# Patient Record
Sex: FEMALE | Race: WHITE | Employment: PART TIME | URBAN - NONMETROPOLITAN AREA
[De-identification: names, ages, dates, MRNs, and addresses within clinical notes are randomized per-mention and may not be internally consistent; named-entity substitution may affect disease eponyms.]

---

## 2017-02-27 ENCOUNTER — TELEPHONE (OUTPATIENT)
Dept: FAMILY MEDICINE CLINIC | Age: 22
End: 2017-02-27

## 2017-03-01 ENCOUNTER — OFFICE VISIT (OUTPATIENT)
Dept: FAMILY MEDICINE CLINIC | Age: 22
End: 2017-03-01

## 2017-03-01 VITALS
TEMPERATURE: 98.6 F | DIASTOLIC BLOOD PRESSURE: 73 MMHG | SYSTOLIC BLOOD PRESSURE: 122 MMHG | OXYGEN SATURATION: 98 % | HEIGHT: 67 IN | WEIGHT: 214 LBS | HEART RATE: 84 BPM | BODY MASS INDEX: 33.59 KG/M2 | RESPIRATION RATE: 12 BRPM

## 2017-03-01 DIAGNOSIS — R09.89 RUNNY NOSE: ICD-10-CM

## 2017-03-01 DIAGNOSIS — J02.9 SORE THROAT: ICD-10-CM

## 2017-03-01 DIAGNOSIS — J06.9 VIRAL UPPER RESPIRATORY TRACT INFECTION: Primary | ICD-10-CM

## 2017-03-01 PROCEDURE — 99213 OFFICE O/P EST LOW 20 MIN: CPT | Performed by: NURSE PRACTITIONER

## 2017-03-01 ASSESSMENT — ENCOUNTER SYMPTOMS
COUGH: 1
WHEEZING: 0
EYE PAIN: 0
DIARRHEA: 0
EYE REDNESS: 0
NAUSEA: 0
RHINORRHEA: 1
EYE DISCHARGE: 0
CONSTIPATION: 0
VOMITING: 0
SHORTNESS OF BREATH: 0
SORE THROAT: 1

## 2017-03-06 ENCOUNTER — TELEPHONE (OUTPATIENT)
Dept: FAMILY MEDICINE CLINIC | Age: 22
End: 2017-03-06

## 2017-03-08 ENCOUNTER — OFFICE VISIT (OUTPATIENT)
Dept: FAMILY MEDICINE CLINIC | Age: 22
End: 2017-03-08

## 2017-03-08 VITALS
SYSTOLIC BLOOD PRESSURE: 112 MMHG | DIASTOLIC BLOOD PRESSURE: 72 MMHG | RESPIRATION RATE: 24 BRPM | BODY MASS INDEX: 33.13 KG/M2 | WEIGHT: 218.6 LBS | HEART RATE: 76 BPM | HEIGHT: 68 IN

## 2017-03-08 DIAGNOSIS — F41.9 ANXIETY: ICD-10-CM

## 2017-03-08 DIAGNOSIS — F32.A DEPRESSION, UNSPECIFIED DEPRESSION TYPE: Primary | ICD-10-CM

## 2017-03-08 PROCEDURE — 99214 OFFICE O/P EST MOD 30 MIN: CPT | Performed by: FAMILY MEDICINE

## 2017-03-08 RX ORDER — DIVALPROEX SODIUM 250 MG/1
250 TABLET, DELAYED RELEASE ORAL 2 TIMES DAILY
Qty: 60 TABLET | Refills: 1 | Status: SHIPPED | OUTPATIENT
Start: 2017-03-08 | End: 2017-04-20 | Stop reason: SDUPTHER

## 2017-03-08 RX ORDER — CITALOPRAM 20 MG/1
20 TABLET ORAL DAILY
Qty: 30 TABLET | Refills: 1 | Status: SHIPPED | OUTPATIENT
Start: 2017-03-08 | End: 2017-04-13 | Stop reason: SDUPTHER

## 2017-03-08 ASSESSMENT — ENCOUNTER SYMPTOMS
CONSTIPATION: 0
SHORTNESS OF BREATH: 0
EYE PAIN: 0
COUGH: 0
RHINORRHEA: 0
VOMITING: 0
DIARRHEA: 0
CHEST TIGHTNESS: 0
ABDOMINAL PAIN: 0
NAUSEA: 0
SORE THROAT: 0
BACK PAIN: 0
WHEEZING: 0
BLOOD IN STOOL: 0

## 2017-04-13 ENCOUNTER — OFFICE VISIT (OUTPATIENT)
Dept: PSYCHIATRY | Age: 22
End: 2017-04-13

## 2017-04-13 DIAGNOSIS — F32.A DEPRESSION, UNSPECIFIED DEPRESSION TYPE: ICD-10-CM

## 2017-04-13 DIAGNOSIS — F43.10 PTSD (POST-TRAUMATIC STRESS DISORDER): ICD-10-CM

## 2017-04-13 DIAGNOSIS — F31.32 BIPOLAR 1 DISORDER, DEPRESSED, MODERATE (HCC): Primary | ICD-10-CM

## 2017-04-13 DIAGNOSIS — F41.0 PANIC DISORDER WITHOUT AGORAPHOBIA WITH MILD PANIC ATTACKS: ICD-10-CM

## 2017-04-13 DIAGNOSIS — F41.9 ANXIETY: ICD-10-CM

## 2017-04-13 PROCEDURE — 99204 OFFICE O/P NEW MOD 45 MIN: CPT | Performed by: NURSE PRACTITIONER

## 2017-04-13 RX ORDER — CITALOPRAM 20 MG/1
40 TABLET ORAL DAILY
Qty: 30 TABLET | Refills: 0 | Status: SHIPPED | OUTPATIENT
Start: 2017-04-13 | End: 2017-06-08 | Stop reason: DRUGHIGH

## 2017-04-20 ENCOUNTER — OFFICE VISIT (OUTPATIENT)
Dept: PSYCHIATRY | Age: 22
End: 2017-04-20

## 2017-04-20 DIAGNOSIS — F31.32 BIPOLAR 1 DISORDER, DEPRESSED, MODERATE (HCC): Primary | ICD-10-CM

## 2017-04-20 DIAGNOSIS — F41.9 ANXIETY: ICD-10-CM

## 2017-04-20 DIAGNOSIS — F43.10 PTSD (POST-TRAUMATIC STRESS DISORDER): ICD-10-CM

## 2017-04-20 DIAGNOSIS — F41.0 PANIC DISORDER WITHOUT AGORAPHOBIA WITH MILD PANIC ATTACKS: ICD-10-CM

## 2017-04-20 DIAGNOSIS — F32.A DEPRESSION, UNSPECIFIED DEPRESSION TYPE: ICD-10-CM

## 2017-04-20 PROCEDURE — 99213 OFFICE O/P EST LOW 20 MIN: CPT | Performed by: NURSE PRACTITIONER

## 2017-04-20 RX ORDER — BUSPIRONE HYDROCHLORIDE 5 MG/1
5 TABLET ORAL 3 TIMES DAILY
Qty: 90 TABLET | Refills: 2 | Status: SHIPPED | OUTPATIENT
Start: 2017-04-20 | End: 2017-04-20 | Stop reason: SDUPTHER

## 2017-04-20 RX ORDER — DIVALPROEX SODIUM 250 MG/1
500 TABLET, EXTENDED RELEASE ORAL NIGHTLY
Qty: 60 TABLET | Refills: 2 | Status: SHIPPED | OUTPATIENT
Start: 2017-04-20 | End: 2017-04-20 | Stop reason: SDUPTHER

## 2017-04-20 RX ORDER — DIVALPROEX SODIUM 250 MG/1
250 TABLET, DELAYED RELEASE ORAL
Qty: 30 TABLET | Refills: 2 | Status: SHIPPED | OUTPATIENT
Start: 2017-04-20 | End: 2017-04-20 | Stop reason: SDUPTHER

## 2017-04-20 RX ORDER — DIVALPROEX SODIUM 250 MG/1
500 TABLET, EXTENDED RELEASE ORAL NIGHTLY
Qty: 60 TABLET | Refills: 2 | Status: SHIPPED | OUTPATIENT
Start: 2017-04-20 | End: 2017-06-08 | Stop reason: DRUGHIGH

## 2017-04-20 RX ORDER — BUSPIRONE HYDROCHLORIDE 5 MG/1
5 TABLET ORAL 3 TIMES DAILY
Qty: 90 TABLET | Refills: 2 | Status: SHIPPED | OUTPATIENT
Start: 2017-04-20 | End: 2017-06-08 | Stop reason: ALTCHOICE

## 2017-04-20 RX ORDER — TRAZODONE HYDROCHLORIDE 50 MG/1
50 TABLET ORAL NIGHTLY
Qty: 30 TABLET | Refills: 2 | Status: SHIPPED | OUTPATIENT
Start: 2017-04-20 | End: 2017-04-20 | Stop reason: SDUPTHER

## 2017-04-20 RX ORDER — DIVALPROEX SODIUM 250 MG/1
250 TABLET, DELAYED RELEASE ORAL
Qty: 30 TABLET | Refills: 2 | Status: SHIPPED | OUTPATIENT
Start: 2017-04-20 | End: 2017-06-08 | Stop reason: SDUPTHER

## 2017-04-20 RX ORDER — TRAZODONE HYDROCHLORIDE 50 MG/1
50 TABLET ORAL NIGHTLY
Qty: 30 TABLET | Refills: 2 | Status: SHIPPED | OUTPATIENT
Start: 2017-04-20 | End: 2017-06-08 | Stop reason: DRUGHIGH

## 2017-06-08 ENCOUNTER — OFFICE VISIT (OUTPATIENT)
Dept: PSYCHIATRY | Age: 22
End: 2017-06-08

## 2017-06-08 DIAGNOSIS — F41.0 PANIC DISORDER WITHOUT AGORAPHOBIA WITH MILD PANIC ATTACKS: ICD-10-CM

## 2017-06-08 DIAGNOSIS — F31.32 BIPOLAR 1 DISORDER, DEPRESSED, MODERATE (HCC): Primary | ICD-10-CM

## 2017-06-08 PROCEDURE — 99214 OFFICE O/P EST MOD 30 MIN: CPT | Performed by: NURSE PRACTITIONER

## 2017-06-08 RX ORDER — DIVALPROEX SODIUM 250 MG/1
250 TABLET, DELAYED RELEASE ORAL 2 TIMES DAILY
Qty: 30 TABLET | Refills: 0 | Status: ON HOLD | OUTPATIENT
Start: 2017-06-08 | End: 2017-10-19

## 2017-06-08 RX ORDER — BUPROPION HYDROCHLORIDE 75 MG/1
75 TABLET ORAL DAILY
Qty: 30 TABLET | Refills: 0 | Status: ON HOLD | OUTPATIENT
Start: 2017-06-08 | End: 2017-10-19

## 2017-06-08 RX ORDER — TRAZODONE HYDROCHLORIDE 50 MG/1
50 TABLET ORAL NIGHTLY
Qty: 30 TABLET | Refills: 0 | Status: ON HOLD | OUTPATIENT
Start: 2017-06-08 | End: 2017-10-19

## 2017-06-08 RX ORDER — HYDROXYZINE PAMOATE 25 MG/1
25 CAPSULE ORAL 3 TIMES DAILY PRN
Qty: 90 CAPSULE | Refills: 0 | Status: SHIPPED | OUTPATIENT
Start: 2017-06-08 | End: 2017-06-22

## 2017-10-19 ENCOUNTER — HOSPITAL ENCOUNTER (EMERGENCY)
Dept: CT IMAGING | Age: 22
Discharge: HOME | End: 2017-10-19
Payer: COMMERCIAL

## 2017-10-19 ENCOUNTER — HOSPITAL ENCOUNTER (OUTPATIENT)
Age: 22
Discharge: HOME | End: 2017-10-22
Attending: NURSE PRACTITIONER | Admitting: SURGERY
Payer: COMMERCIAL

## 2017-10-19 ENCOUNTER — ANESTHESIA EVENT (OUTPATIENT)
Dept: OPERATING ROOM | Age: 22
End: 2017-10-19
Payer: COMMERCIAL

## 2017-10-19 ENCOUNTER — ANESTHESIA (OUTPATIENT)
Dept: OPERATING ROOM | Age: 22
End: 2017-10-19
Payer: COMMERCIAL

## 2017-10-19 VITALS
RESPIRATION RATE: 51 BRPM | OXYGEN SATURATION: 100 % | SYSTOLIC BLOOD PRESSURE: 140 MMHG | DIASTOLIC BLOOD PRESSURE: 65 MMHG | TEMPERATURE: 96.8 F

## 2017-10-19 DIAGNOSIS — K35.80 ACUTE APPENDICITIS, UNSPECIFIED ACUTE APPENDICITIS TYPE: Primary | ICD-10-CM

## 2017-10-19 LAB
ALBUMIN SERPL-MCNC: 4.2 G/DL (ref 3.5–5.1)
ALP BLD-CCNC: 86 U/L (ref 38–126)
ALT SERPL-CCNC: 14 U/L (ref 11–66)
AST SERPL-CCNC: 14 U/L (ref 5–40)
BASOPHILS # BLD: 0.3 %
BASOPHILS ABSOLUTE: 0 THOU/MM3 (ref 0–0.1)
BILIRUB SERPL-MCNC: 0.9 MG/DL (ref 0.3–1.2)
BILIRUBIN DIRECT: < 0.2 MG/DL (ref 0–0.3)
EOSINOPHIL # BLD: 0.5 %
EOSINOPHILS ABSOLUTE: 0.1 THOU/MM3 (ref 0–0.4)
HCT VFR BLD CALC: 42.6 % (ref 37–47)
HEMOGLOBIN: 13.7 GM/DL (ref 12–16)
LYMPHOCYTES # BLD: 13.6 %
LYMPHOCYTES ABSOLUTE: 2 THOU/MM3 (ref 1–4.8)
MCH RBC QN AUTO: 28.9 PG (ref 27–31)
MCHC RBC AUTO-ENTMCNC: 32.2 GM/DL (ref 33–37)
MCV RBC AUTO: 90 FL (ref 81–99)
MONOCYTES # BLD: 4.4 %
MONOCYTES ABSOLUTE: 0.7 THOU/MM3 (ref 0.4–1.3)
NUCLEATED RED BLOOD CELLS: 0 /100 WBC
PDW BLD-RTO: 11.5 % (ref 11.5–14.5)
PLATELET # BLD: 185 THOU/MM3 (ref 130–400)
PMV BLD AUTO: 9.9 MCM (ref 7.4–10.4)
PREGNANCY, SERUM: NEGATIVE
RBC # BLD: 4.74 MILL/MM3 (ref 4.2–5.4)
RBC # BLD: NORMAL 10*6/UL
SEG NEUTROPHILS: 81.2 %
SEGMENTED NEUTROPHILS ABSOLUTE COUNT: 12.2 THOU/MM3 (ref 1.8–7.7)
TOTAL PROTEIN: 6.9 G/DL (ref 6.1–8)
WBC # BLD: 15 THOU/MM3 (ref 4.8–10.8)

## 2017-10-19 PROCEDURE — 96374 THER/PROPH/DIAG INJ IV PUSH: CPT

## 2017-10-19 PROCEDURE — 2500000003 HC RX 250 WO HCPCS: Performed by: SURGERY

## 2017-10-19 PROCEDURE — G0378 HOSPITAL OBSERVATION PER HR: HCPCS

## 2017-10-19 PROCEDURE — 6360000002 HC RX W HCPCS

## 2017-10-19 PROCEDURE — 3700000001 HC ADD 15 MINUTES (ANESTHESIA): Performed by: SURGERY

## 2017-10-19 PROCEDURE — 7100000001 HC PACU RECOVERY - ADDTL 15 MIN: Performed by: SURGERY

## 2017-10-19 PROCEDURE — 84703 CHORIONIC GONADOTROPIN ASSAY: CPT

## 2017-10-19 PROCEDURE — 88304 TISSUE EXAM BY PATHOLOGIST: CPT

## 2017-10-19 PROCEDURE — S0028 INJECTION, FAMOTIDINE, 20 MG: HCPCS | Performed by: SURGERY

## 2017-10-19 PROCEDURE — 2500000003 HC RX 250 WO HCPCS

## 2017-10-19 PROCEDURE — 6360000002 HC RX W HCPCS: Performed by: SURGERY

## 2017-10-19 PROCEDURE — 44970 LAPAROSCOPY APPENDECTOMY: CPT | Performed by: SURGERY

## 2017-10-19 PROCEDURE — 3700000000 HC ANESTHESIA ATTENDED CARE: Performed by: SURGERY

## 2017-10-19 PROCEDURE — 6360000004 HC RX CONTRAST MEDICATION: Performed by: NURSE PRACTITIONER

## 2017-10-19 PROCEDURE — 6360000002 HC RX W HCPCS: Performed by: EMERGENCY MEDICINE

## 2017-10-19 PROCEDURE — 6360000002 HC RX W HCPCS: Performed by: ANESTHESIOLOGY

## 2017-10-19 PROCEDURE — 36415 COLL VENOUS BLD VENIPUNCTURE: CPT

## 2017-10-19 PROCEDURE — 85025 COMPLETE CBC W/AUTO DIFF WBC: CPT

## 2017-10-19 PROCEDURE — 99284 EMERGENCY DEPT VISIT MOD MDM: CPT

## 2017-10-19 PROCEDURE — 2720000010 HC SURG SUPPLY STERILE: Performed by: SURGERY

## 2017-10-19 PROCEDURE — 99215 OFFICE O/P EST HI 40 MIN: CPT

## 2017-10-19 PROCEDURE — 6360000002 HC RX W HCPCS: Performed by: NURSE PRACTITIONER

## 2017-10-19 PROCEDURE — 2580000003 HC RX 258: Performed by: EMERGENCY MEDICINE

## 2017-10-19 PROCEDURE — 96375 TX/PRO/DX INJ NEW DRUG ADDON: CPT

## 2017-10-19 PROCEDURE — 2580000003 HC RX 258: Performed by: SURGERY

## 2017-10-19 PROCEDURE — 3600000013 HC SURGERY LEVEL 3 ADDTL 15MIN: Performed by: SURGERY

## 2017-10-19 PROCEDURE — 74177 CT ABD & PELVIS W/CONTRAST: CPT

## 2017-10-19 PROCEDURE — 2500000003 HC RX 250 WO HCPCS: Performed by: ANESTHESIOLOGY

## 2017-10-19 PROCEDURE — 80076 HEPATIC FUNCTION PANEL: CPT

## 2017-10-19 PROCEDURE — 7100000000 HC PACU RECOVERY - FIRST 15 MIN: Performed by: SURGERY

## 2017-10-19 PROCEDURE — 2720000003 HC MISC SUTURE/STAPLES/RELOADS/ETC: Performed by: SURGERY

## 2017-10-19 PROCEDURE — 96361 HYDRATE IV INFUSION ADD-ON: CPT

## 2017-10-19 PROCEDURE — 3600000003 HC SURGERY LEVEL 3 BASE: Performed by: SURGERY

## 2017-10-19 PROCEDURE — 99214 OFFICE O/P EST MOD 30 MIN: CPT | Performed by: NURSE PRACTITIONER

## 2017-10-19 PROCEDURE — 99219 PR INITIAL OBSERVATION CARE/DAY 50 MINUTES: CPT | Performed by: SURGERY

## 2017-10-19 RX ORDER — ACETAMINOPHEN 325 MG/1
650 TABLET ORAL EVERY 4 HOURS PRN
Status: DISCONTINUED | OUTPATIENT
Start: 2017-10-19 | End: 2017-10-22 | Stop reason: HOSPADM

## 2017-10-19 RX ORDER — SODIUM CHLORIDE 0.9 % (FLUSH) 0.9 %
10 SYRINGE (ML) INJECTION EVERY 12 HOURS SCHEDULED
Status: DISCONTINUED | OUTPATIENT
Start: 2017-10-19 | End: 2017-10-19 | Stop reason: SDUPTHER

## 2017-10-19 RX ORDER — AMPICILLIN AND SULBACTAM 1; .5 G/1; G/1
INJECTION, POWDER, FOR SOLUTION INTRAMUSCULAR; INTRAVENOUS PRN
Status: DISCONTINUED | OUTPATIENT
Start: 2017-10-19 | End: 2017-10-19 | Stop reason: SDUPTHER

## 2017-10-19 RX ORDER — HYDROCODONE BITARTRATE AND ACETAMINOPHEN 5; 325 MG/1; MG/1
2 TABLET ORAL EVERY 4 HOURS PRN
Status: DISCONTINUED | OUTPATIENT
Start: 2017-10-19 | End: 2017-10-20

## 2017-10-19 RX ORDER — DEXAMETHASONE SODIUM PHOSPHATE 4 MG/ML
INJECTION, SOLUTION INTRA-ARTICULAR; INTRALESIONAL; INTRAMUSCULAR; INTRAVENOUS; SOFT TISSUE PRN
Status: DISCONTINUED | OUTPATIENT
Start: 2017-10-19 | End: 2017-10-19 | Stop reason: SDUPTHER

## 2017-10-19 RX ORDER — MORPHINE SULFATE 4 MG/ML
4 INJECTION, SOLUTION INTRAMUSCULAR; INTRAVENOUS ONCE
Status: DISCONTINUED | OUTPATIENT
Start: 2017-10-19 | End: 2017-10-19

## 2017-10-19 RX ORDER — FENTANYL CITRATE 50 UG/ML
INJECTION, SOLUTION INTRAMUSCULAR; INTRAVENOUS PRN
Status: DISCONTINUED | OUTPATIENT
Start: 2017-10-19 | End: 2017-10-19 | Stop reason: SDUPTHER

## 2017-10-19 RX ORDER — ACETAMINOPHEN 325 MG/1
650 TABLET ORAL EVERY 4 HOURS PRN
Status: DISCONTINUED | OUTPATIENT
Start: 2017-10-19 | End: 2017-10-19 | Stop reason: SDUPTHER

## 2017-10-19 RX ORDER — ONDANSETRON 2 MG/ML
4 INJECTION INTRAMUSCULAR; INTRAVENOUS
Status: COMPLETED | OUTPATIENT
Start: 2017-10-19 | End: 2017-10-19

## 2017-10-19 RX ORDER — MORPHINE SULFATE 2 MG/ML
2 INJECTION, SOLUTION INTRAMUSCULAR; INTRAVENOUS
Status: DISCONTINUED | OUTPATIENT
Start: 2017-10-19 | End: 2017-10-20

## 2017-10-19 RX ORDER — 0.9 % SODIUM CHLORIDE 0.9 %
1000 INTRAVENOUS SOLUTION INTRAVENOUS ONCE
Status: COMPLETED | OUTPATIENT
Start: 2017-10-19 | End: 2017-10-19

## 2017-10-19 RX ORDER — HYDRALAZINE HYDROCHLORIDE 20 MG/ML
5 INJECTION INTRAMUSCULAR; INTRAVENOUS EVERY 10 MIN PRN
Status: DISCONTINUED | OUTPATIENT
Start: 2017-10-19 | End: 2017-10-19 | Stop reason: HOSPADM

## 2017-10-19 RX ORDER — TRAZODONE HYDROCHLORIDE 50 MG/1
50 TABLET ORAL NIGHTLY
Status: DISCONTINUED | OUTPATIENT
Start: 2017-10-19 | End: 2017-10-20

## 2017-10-19 RX ORDER — ONDANSETRON 2 MG/ML
4 INJECTION INTRAMUSCULAR; INTRAVENOUS ONCE
Status: DISCONTINUED | OUTPATIENT
Start: 2017-10-19 | End: 2017-10-19 | Stop reason: SDUPTHER

## 2017-10-19 RX ORDER — PROPOFOL 10 MG/ML
INJECTION, EMULSION INTRAVENOUS PRN
Status: DISCONTINUED | OUTPATIENT
Start: 2017-10-19 | End: 2017-10-19 | Stop reason: SDUPTHER

## 2017-10-19 RX ORDER — ONDANSETRON 2 MG/ML
4 INJECTION INTRAMUSCULAR; INTRAVENOUS EVERY 6 HOURS PRN
Status: DISCONTINUED | OUTPATIENT
Start: 2017-10-19 | End: 2017-10-20

## 2017-10-19 RX ORDER — GLYCOPYRROLATE 0.2 MG/ML
INJECTION INTRAMUSCULAR; INTRAVENOUS PRN
Status: DISCONTINUED | OUTPATIENT
Start: 2017-10-19 | End: 2017-10-19 | Stop reason: SDUPTHER

## 2017-10-19 RX ORDER — MIDAZOLAM HYDROCHLORIDE 1 MG/ML
INJECTION INTRAMUSCULAR; INTRAVENOUS PRN
Status: DISCONTINUED | OUTPATIENT
Start: 2017-10-19 | End: 2017-10-19 | Stop reason: SDUPTHER

## 2017-10-19 RX ORDER — MEPERIDINE HYDROCHLORIDE 25 MG/ML
12.5 INJECTION INTRAMUSCULAR; INTRAVENOUS; SUBCUTANEOUS EVERY 5 MIN PRN
Status: DISCONTINUED | OUTPATIENT
Start: 2017-10-19 | End: 2017-10-19 | Stop reason: HOSPADM

## 2017-10-19 RX ORDER — BUPROPION HYDROCHLORIDE 75 MG/1
75 TABLET ORAL DAILY
Status: DISCONTINUED | OUTPATIENT
Start: 2017-10-20 | End: 2017-10-20

## 2017-10-19 RX ORDER — SODIUM CHLORIDE 0.9 % (FLUSH) 0.9 %
10 SYRINGE (ML) INJECTION EVERY 12 HOURS SCHEDULED
Status: DISCONTINUED | OUTPATIENT
Start: 2017-10-19 | End: 2017-10-20

## 2017-10-19 RX ORDER — SODIUM CHLORIDE 0.9 % (FLUSH) 0.9 %
10 SYRINGE (ML) INJECTION PRN
Status: DISCONTINUED | OUTPATIENT
Start: 2017-10-19 | End: 2017-10-19 | Stop reason: SDUPTHER

## 2017-10-19 RX ORDER — KETOROLAC TROMETHAMINE 30 MG/ML
30 INJECTION, SOLUTION INTRAMUSCULAR; INTRAVENOUS ONCE
Status: COMPLETED | OUTPATIENT
Start: 2017-10-19 | End: 2017-10-19

## 2017-10-19 RX ORDER — HYDROCODONE BITARTRATE AND ACETAMINOPHEN 5; 325 MG/1; MG/1
1 TABLET ORAL EVERY 4 HOURS PRN
Status: DISCONTINUED | OUTPATIENT
Start: 2017-10-19 | End: 2017-10-20

## 2017-10-19 RX ORDER — FENTANYL CITRATE 50 UG/ML
50 INJECTION, SOLUTION INTRAMUSCULAR; INTRAVENOUS EVERY 5 MIN PRN
Status: COMPLETED | OUTPATIENT
Start: 2017-10-19 | End: 2017-10-19

## 2017-10-19 RX ORDER — MORPHINE SULFATE 2 MG/ML
4 INJECTION, SOLUTION INTRAMUSCULAR; INTRAVENOUS
Status: DISCONTINUED | OUTPATIENT
Start: 2017-10-19 | End: 2017-10-20

## 2017-10-19 RX ORDER — BUPIVACAINE HYDROCHLORIDE AND EPINEPHRINE 5; 5 MG/ML; UG/ML
INJECTION, SOLUTION EPIDURAL; INTRACAUDAL; PERINEURAL PRN
Status: DISCONTINUED | OUTPATIENT
Start: 2017-10-19 | End: 2017-10-19 | Stop reason: HOSPADM

## 2017-10-19 RX ORDER — NEOSTIGMINE METHYLSULFATE 1 MG/ML
INJECTION, SOLUTION INTRAVENOUS PRN
Status: DISCONTINUED | OUTPATIENT
Start: 2017-10-19 | End: 2017-10-19 | Stop reason: SDUPTHER

## 2017-10-19 RX ORDER — SODIUM CHLORIDE 0.9 % (FLUSH) 0.9 %
10 SYRINGE (ML) INJECTION PRN
Status: DISCONTINUED | OUTPATIENT
Start: 2017-10-19 | End: 2017-10-20

## 2017-10-19 RX ORDER — ROCURONIUM BROMIDE 10 MG/ML
INJECTION, SOLUTION INTRAVENOUS PRN
Status: DISCONTINUED | OUTPATIENT
Start: 2017-10-19 | End: 2017-10-19 | Stop reason: SDUPTHER

## 2017-10-19 RX ORDER — MORPHINE SULFATE 2 MG/ML
2 INJECTION, SOLUTION INTRAMUSCULAR; INTRAVENOUS EVERY 5 MIN PRN
Status: DISCONTINUED | OUTPATIENT
Start: 2017-10-19 | End: 2017-10-19 | Stop reason: HOSPADM

## 2017-10-19 RX ORDER — SODIUM CHLORIDE 9 MG/ML
INJECTION, SOLUTION INTRAVENOUS CONTINUOUS
Status: DISCONTINUED | OUTPATIENT
Start: 2017-10-19 | End: 2017-10-20

## 2017-10-19 RX ORDER — DIVALPROEX SODIUM 250 MG/1
250 TABLET, DELAYED RELEASE ORAL 2 TIMES DAILY
Status: DISCONTINUED | OUTPATIENT
Start: 2017-10-19 | End: 2017-10-20

## 2017-10-19 RX ORDER — LABETALOL HYDROCHLORIDE 5 MG/ML
5 INJECTION, SOLUTION INTRAVENOUS EVERY 5 MIN PRN
Status: DISCONTINUED | OUTPATIENT
Start: 2017-10-19 | End: 2017-10-19 | Stop reason: HOSPADM

## 2017-10-19 RX ORDER — DIPHENHYDRAMINE HYDROCHLORIDE 50 MG/ML
12.5 INJECTION INTRAMUSCULAR; INTRAVENOUS
Status: DISCONTINUED | OUTPATIENT
Start: 2017-10-19 | End: 2017-10-19 | Stop reason: HOSPADM

## 2017-10-19 RX ADMIN — Medication 10 ML: at 22:44

## 2017-10-19 RX ADMIN — MEPERIDINE HYDROCHLORIDE 12.5 MG: 25 INJECTION, SOLUTION INTRAMUSCULAR; INTRAVENOUS; SUBCUTANEOUS at 20:26

## 2017-10-19 RX ADMIN — MORPHINE SULFATE 2 MG: 2 INJECTION, SOLUTION INTRAMUSCULAR; INTRAVENOUS at 20:35

## 2017-10-19 RX ADMIN — NEOSTIGMINE METHYLSULFATE 3 MG: 1 INJECTION, SOLUTION INTRAVENOUS at 19:25

## 2017-10-19 RX ADMIN — FENTANYL CITRATE 50 MCG: 50 INJECTION, SOLUTION INTRAMUSCULAR; INTRAVENOUS at 20:00

## 2017-10-19 RX ADMIN — FENTANYL CITRATE 50 MCG: 50 INJECTION, SOLUTION INTRAMUSCULAR; INTRAVENOUS at 19:50

## 2017-10-19 RX ADMIN — FENTANYL CITRATE 50 MCG: 50 INJECTION INTRAMUSCULAR; INTRAVENOUS at 19:25

## 2017-10-19 RX ADMIN — ONDANSETRON 4 MG: 2 INJECTION INTRAMUSCULAR; INTRAVENOUS at 19:08

## 2017-10-19 RX ADMIN — IOPAMIDOL 85 ML: 755 INJECTION, SOLUTION INTRAVENOUS at 14:44

## 2017-10-19 RX ADMIN — KETOROLAC TROMETHAMINE 30 MG: 30 INJECTION, SOLUTION INTRAMUSCULAR at 17:03

## 2017-10-19 RX ADMIN — FENTANYL CITRATE 50 MCG: 50 INJECTION, SOLUTION INTRAMUSCULAR; INTRAVENOUS at 20:20

## 2017-10-19 RX ADMIN — TAZOBACTAM SODIUM AND PIPERACILLIN SODIUM 3.38 G: 375; 3 INJECTION, SOLUTION INTRAVENOUS at 18:12

## 2017-10-19 RX ADMIN — PROPOFOL 150 MG: 10 INJECTION, EMULSION INTRAVENOUS at 18:52

## 2017-10-19 RX ADMIN — SODIUM CHLORIDE 1000 ML: 9 INJECTION, SOLUTION INTRAVENOUS at 16:00

## 2017-10-19 RX ADMIN — SODIUM CHLORIDE: 9 INJECTION, SOLUTION INTRAVENOUS at 21:30

## 2017-10-19 RX ADMIN — MIDAZOLAM HYDROCHLORIDE 2 MG: 1 INJECTION, SOLUTION INTRAMUSCULAR; INTRAVENOUS at 18:47

## 2017-10-19 RX ADMIN — AMPICILLIN SODIUM AND SULBACTAM SODIUM 1.5 G: 1; .5 INJECTION, POWDER, FOR SOLUTION INTRAMUSCULAR; INTRAVENOUS at 18:55

## 2017-10-19 RX ADMIN — FENTANYL CITRATE 50 MCG: 50 INJECTION INTRAMUSCULAR; INTRAVENOUS at 19:15

## 2017-10-19 RX ADMIN — DEXAMETHASONE SODIUM PHOSPHATE 8 MG: 4 INJECTION, SOLUTION INTRAMUSCULAR; INTRAVENOUS at 19:08

## 2017-10-19 RX ADMIN — ROCURONIUM BROMIDE 30 MG: 10 INJECTION INTRAVENOUS at 18:52

## 2017-10-19 RX ADMIN — GLYCOPYRROLATE 0.6 MG: 0.2 INJECTION, SOLUTION INTRAMUSCULAR; INTRAVENOUS at 19:25

## 2017-10-19 RX ADMIN — FENTANYL CITRATE 50 MCG: 50 INJECTION INTRAMUSCULAR; INTRAVENOUS at 19:10

## 2017-10-19 RX ADMIN — FENTANYL CITRATE 50 MCG: 50 INJECTION INTRAMUSCULAR; INTRAVENOUS at 19:35

## 2017-10-19 RX ADMIN — FENTANYL CITRATE 100 MCG: 50 INJECTION INTRAMUSCULAR; INTRAVENOUS at 18:47

## 2017-10-19 RX ADMIN — FAMOTIDINE 20 MG: 10 INJECTION, SOLUTION INTRAVENOUS at 22:44

## 2017-10-19 RX ADMIN — FENTANYL CITRATE 50 MCG: 50 INJECTION, SOLUTION INTRAMUSCULAR; INTRAVENOUS at 20:10

## 2017-10-19 RX ADMIN — MORPHINE SULFATE 2 MG: 2 INJECTION, SOLUTION INTRAMUSCULAR; INTRAVENOUS at 22:45

## 2017-10-19 RX ADMIN — IOHEXOL 50 ML: 240 INJECTION, SOLUTION INTRATHECAL; INTRAVASCULAR; INTRAVENOUS; ORAL at 14:44

## 2017-10-19 ASSESSMENT — PAIN DESCRIPTION - ONSET
ONSET: AWAKENED FROM SLEEP
ONSET: ON-GOING

## 2017-10-19 ASSESSMENT — PULMONARY FUNCTION TESTS
PIF_VALUE: 23
PIF_VALUE: 21
PIF_VALUE: 2
PIF_VALUE: 22
PIF_VALUE: 23
PIF_VALUE: 11
PIF_VALUE: 20
PIF_VALUE: 21
PIF_VALUE: 17
PIF_VALUE: 0
PIF_VALUE: 23
PIF_VALUE: 10
PIF_VALUE: 2
PIF_VALUE: 10
PIF_VALUE: 23
PIF_VALUE: 21
PIF_VALUE: 23
PIF_VALUE: 21
PIF_VALUE: 10
PIF_VALUE: 9
PIF_VALUE: 23
PIF_VALUE: 0
PIF_VALUE: 24
PIF_VALUE: 9
PIF_VALUE: 13
PIF_VALUE: 22
PIF_VALUE: 23
PIF_VALUE: 19
PIF_VALUE: 10
PIF_VALUE: 21
PIF_VALUE: 23
PIF_VALUE: 18
PIF_VALUE: 24
PIF_VALUE: 10
PIF_VALUE: 23
PIF_VALUE: 22
PIF_VALUE: 21
PIF_VALUE: 21
PIF_VALUE: 23
PIF_VALUE: 11
PIF_VALUE: 21
PIF_VALUE: 9
PIF_VALUE: 24

## 2017-10-19 ASSESSMENT — PAIN DESCRIPTION - DESCRIPTORS
DESCRIPTORS: PRESSURE;ACHING
DESCRIPTORS: SHARP
DESCRIPTORS: SHARP
DESCRIPTORS: STABBING

## 2017-10-19 ASSESSMENT — PAIN DESCRIPTION - LOCATION
LOCATION: ABDOMEN

## 2017-10-19 ASSESSMENT — PAIN SCALES - GENERAL
PAINLEVEL_OUTOF10: 7
PAINLEVEL_OUTOF10: 9
PAINLEVEL_OUTOF10: 8
PAINLEVEL_OUTOF10: 6
PAINLEVEL_OUTOF10: 8
PAINLEVEL_OUTOF10: 9
PAINLEVEL_OUTOF10: 7
PAINLEVEL_OUTOF10: 8
PAINLEVEL_OUTOF10: 6

## 2017-10-19 ASSESSMENT — ENCOUNTER SYMPTOMS
NAUSEA: 0
DIARRHEA: 0
BLOOD IN STOOL: 0
CONSTIPATION: 0
VOMITING: 1
ABDOMINAL PAIN: 1

## 2017-10-19 ASSESSMENT — PAIN DESCRIPTION - PAIN TYPE
TYPE: ACUTE PAIN
TYPE: ACUTE PAIN
TYPE: ACUTE PAIN;SURGICAL PAIN

## 2017-10-19 ASSESSMENT — PAIN DESCRIPTION - PROGRESSION
CLINICAL_PROGRESSION: NOT CHANGED

## 2017-10-19 ASSESSMENT — PAIN DESCRIPTION - FREQUENCY
FREQUENCY: CONTINUOUS
FREQUENCY: CONTINUOUS

## 2017-10-19 ASSESSMENT — PAIN DESCRIPTION - ORIENTATION
ORIENTATION: RIGHT;LOWER;UPPER
ORIENTATION: MID;LOWER;LEFT
ORIENTATION: RIGHT;LOWER;MID
ORIENTATION: RIGHT

## 2017-10-19 NOTE — BRIEF OP NOTE
Brief Postoperative Note    Sebastián Fernandez  YOB: 1995  [id number]    Pre-operative Diagnosis: Acute appendicitis    Post-operative Diagnosis: Same    Procedure: Laparoscopic appendectomy    Anesthesia: General and Local    Surgeons/Assistants:  Shane/King    Estimated Blood Loss: 20 ml    Complications: None    Specimens: Was Obtained: appendix    Findings: as above    Electronically signed by Kayla Montalvo MD on 10/19/2017 at 6:55 PM

## 2017-10-19 NOTE — ED PROVIDER NOTES
[facility] EMERGENCY DEPT      CHIEF COMPLAINT       Chief Complaint   Patient presents with    Abdominal Pain       Nurses Notes reviewed and I agree except as noted in the HPI. HISTORY OF PRESENT ILLNESS    Benji Perez is a 23 y.o. female who presents Complaint right lower quadrant pain, she's had the pain since yesterday, she is vomited 6 times. Patient noted to Have acute appendicitis on CAT scan done outpatient. Onset: acute  Duration: one day  Timing: constant  Location of Pain: RLQ pain  Intesity/severity: moderate  Modifying Factors: movement  Relieved by;  Previous Episodes; Tx Before arrival: none  REVIEW OF SYSTEMS      Review of Systems   Constitutional: Negative for fever, chills, diaphoresis and fatigue. HENT: Negative for congestion, drooling, facial swelling and sore throat. Eyes: Negative for photophobia, pain and discharge. Respiratory: Negative for cough, shortness of breath, wheezing and stridor. Cardiovascular: Negative for chest pain, palpitations and leg swelling. Gastrointestinal: Positive for abdominal pain; Neg for blood in stool and abdominal distention. Endocrine: Negative for cold intolerance, heat intolerance, polydipsia and polyuria. Genitourinary: Negative for dysuria, urgency, hematuria and difficulty urinating. Musculoskeletal: Negative for gait problem, neck pain and neck stiffness. Allergic/Immunologic: Negative for environmental allergies, food allergies and immunocompromised state. Skin; No rash, No itching  Neurological: Negative for seizures, weakness and numbness. Hematological: Negative for adenopathy. Does not bruise/bleed easily. Psychiatric/Behavioral: Negative for hallucinations, confusion and agitation. PAST MEDICAL HISTORY    has a past medical history of Asthma and Depression. SURGICAL HISTORY      has a past surgical history that includes knee surgery (Right).     CURRENT MEDICATIONS       Previous Medications    BUPROPION (WELLBUTRIN) 75 MG TABLET    Take 1 tablet by mouth daily    DIVALPROEX (DEPAKOTE) 250 MG DR TABLET    Take 1 tablet by mouth 2 times daily    TRAZODONE (DESYREL) 50 MG TABLET    Take 1 tablet by mouth nightly       ALLERGIES     has No Known Allergies. FAMILY HISTORY     indicated that her mother is alive. She indicated that her father is alive. She indicated that the status of her maternal grandmother is unknown. She indicated that the status of her maternal grandfather is unknown.    family history includes Arthritis in her maternal grandfather, maternal grandmother, and mother; Asthma in her maternal grandmother; Bipolar Disorder in her father; Cancer in her maternal grandmother; Depression in her father, maternal grandmother, and mother; Early Death in her maternal grandmother; High Blood Pressure in her maternal grandmother; Schizophrenia in her father. SOCIAL HISTORY      reports that she has been smoking Cigarettes. She has been smoking about 0.50 packs per day. She has never used smokeless tobacco. She reports that she does not drink alcohol or use drugs. PHYSICAL EXAM     INITIAL VITALS:  height is 5' 6\" (1.676 m) and weight is 220 lb (99.8 kg). Her oral temperature is 98.6 °F (37 °C). Her blood pressure is 115/68 and her pulse is 88. Her respiration is 18 and oxygen saturation is 100%. Physical Exam   Constitutional:  well-developed and well-nourished. HENT: Head: Normocephalic, atraumatic, Bilateral external ears normal, Oropharynx mosit, No oral exudates, Nose normal.   Eyes: PERRL, EOMI, Conjunctiva normal, No discharge. No scleral icterus  Neck: Normal range of motion, No tenderness, Supple  Lympatics: No lymphadenopathy. Cardiovascular: Normal rate, regular rhythm, S1 normal and S2 normal.  Exam reveals no gallop. Pulmonary/Chest: Effort normal and breath sounds normal. No accessory muscle usage or stridor. No respiratory distress. no wheezes. has no rales.  exhibits no tenderness. Abdominal: Soft. Bowel sounds are normal.  exhibits no distension. There is no tenderness. There is no rebound and no guarding. Genitourinary:   Extremities: No edema, no tenderness, no cyanosis, no clubbing. Musculoskeletal: Good range of motion in major joints is observed. No major deformities noted. Neurological: Alert and oriented ×3, normal motor function, normal sensory function, no focal deficits. GCS   Skin: Skin is warm, dry and intact. No rash noted. No erythema. Psychiatric: Affect normal, judgment normal, mood normal.  DIFFERENTIAL DIAGNOSIS:   Appy    DIAGNOSTIC RESULTS     EKG: All EKG's are interpreted by the Emergency Department Physician who either signs or Co-signs this chart in the absence of a cardiologist.      RADIOLOGY: non-plain film images(s) such as CT, Ultrasound and MRI are read by the radiologist.  Plain radiographic images are visualized and preliminarily interpreted by the emergency physician unless otherwise stated below. LABS:   Labs Reviewed   CBC WITH AUTO DIFFERENTIAL - Abnormal; Notable for the following:        Result Value    WBC 15.0 (*)     MCHC 32.2 (*)     All other components within normal limits   HCG, SERUM, QUALITATIVE   DIFFERENTIAL   HEPATIC FUNCTION PANEL   URINALYSIS WITH MICROSCOPIC   PREGNANCY, URINE       EMERGENCY DEPARTMENT COURSE:   Vitals:    Vitals:    10/19/17 1210 10/19/17 1556   BP: 124/67 115/68   Pulse: 95 88   Resp: 18 18   Temp: 98.5 °F (36.9 °C) 98.6 °F (37 °C)   TempSrc: Temporal Oral   SpO2: 99% 100%   Weight: 220 lb (99.8 kg)    Height: 5' 6\" (1.676 m)          CRITICAL CARE:       CONSULTS:  None    PROCEDURES:  None    FINAL IMPRESSION      1. Acute appendicitis, unspecified acute appendicitis type          DISPOSITION/PLAN   Decision To Admit   Pt presented for right lower quadrant pain, has appendicitis on CAT scan. Local peritonitis, case discussed with Dr. Mireille Baca, will come see patient.   Patient does not want pain medication. PATIENT REFERRED TO:  No follow-up provider specified.     DISCHARGE MEDICATIONS:  New Prescriptions    No medications on file       (Please note that portions of this note were completed with a voice recognition program.  Efforts were made to edit the dictations but occasionally words are mis-transcribed.)    Susie Martins, [facility]   10/20/17 5756

## 2017-10-19 NOTE — ED NOTES
Patient c/o right lower quadrant pain 8/10 pain is less as long as she lays still. Patient denies needs. Patient is tearful. /68 HR 87 O2 sat 99 Resp 18. Transported via [facility] to [address] [place] [address] ED. Report given to [facility].       Cristhian Hedrick RN  10/19/17 0576

## 2017-10-19 NOTE — ANESTHESIA PRE PROCEDURE
Social History   Substance Use Topics    Smoking status: Current Every Day Smoker     Packs/day: 0.50     Types: Cigarettes    Smokeless tobacco: Never Used    Alcohol use No                                Ready to quit: Not Answered  Counseling given: Not Answered      Vital Signs (Current):   Vitals:    10/19/17 1210 10/19/17 1556 10/19/17 1652 10/19/17 1812   BP: 124/67 115/68 (!) 100/51 115/68   Pulse: 95 88 90 88   Resp: 18 18 17 16   Temp: 98.5 °F (36.9 °C) 98.6 °F (37 °C)     TempSrc: Temporal Oral     SpO2: 99% 100% 100% 100%   Weight: 220 lb (99.8 kg)      Height: 5' 6\" (1.676 m)                                                 BP Readings from Last 3 Encounters:   10/19/17 115/68   10/19/17 123/62   03/08/17 112/72       NPO Status:                                                                                 BMI:   Wt Readings from Last 3 Encounters:   10/19/17 220 lb (99.8 kg)   03/08/17 218 lb 9.6 oz (99.2 kg)   03/01/17 214 lb (97.1 kg)     Body mass index is 35.51 kg/m². CBC:   Lab Results   Component Value Date    WBC 15.0 10/19/2017    RBC 4.74 10/19/2017    RBC 5.12 01/20/2016    HGB 13.7 10/19/2017    HCT 42.6 10/19/2017    MCV 90 10/19/2017    RDW 11.5 10/19/2017     10/19/2017       CMP:   Lab Results   Component Value Date     01/20/2016    K 3.9 01/20/2016     01/20/2016    CO2 24 01/20/2016    BUN 10 01/20/2016    CREATININE 0.7 01/20/2016    LABGLOM >90 10/09/2013    GLUCOSE 93 01/20/2016    PROT 6.9 10/19/2017    CALCIUM 9.3 01/20/2016    BILITOT 0.9 10/19/2017    ALKPHOS 86 10/19/2017    AST 14 10/19/2017    ALT 14 10/19/2017       POC Tests: No results for input(s): POCGLU, POCNA, POCK, POCCL, POCBUN, POCHEMO, POCHCT in the last 72 hours.     Coags: No results found for: PROTIME, INR, APTT    HCG (If Applicable):   Lab Results   Component Value Date    PREGSERUM NEGATIVE 10/19/2017        ABGs: No results found for: PHART, PO2ART, UCO7JRG, MOT9LFY, BEART, Z1BZVTPG     Type & Screen (If Applicable):  Lab Results   Component Value Date    [address] POS 10/09/2013       Anesthesia Evaluation  Patient summary reviewed and Nursing notes reviewed no history of anesthetic complications:   Airway: Mallampati: II  TM distance: >3 FB   Neck ROM: full  Mouth opening: > = 3 FB Dental:          Pulmonary: breath sounds clear to auscultation  (+) asthma:                            Cardiovascular:            Rhythm: regular  Rate: normal                    Neuro/Psych:   (+) psychiatric history:            GI/Hepatic/Renal:             Endo/Other:                     Abdominal:   (+) obese,         Vascular:                                      Anesthesia Plan      general     ASA 2       Induction: intravenous. MIPS: Postoperative opioids intended and Prophylactic antiemetics administered. Anesthetic plan and risks discussed with patient.       Plan discussed with attending and surgical team.                  Shaun Owens DO   10/19/2017

## 2017-10-19 NOTE — ED NOTES
Patient with continued complaint of R sided abdominal pain. Rates pain 7/10 on pain scale, although does not want \"anything too strong\". Updated Dr. Armond Canavan with verbalized order for alternative pain medication. Upon return from restroom, side rails placed up x2. Call light replaced back within reach. Updated on plan of care with verbalized understanding.       Jacques Oro RN  10/19/17 6679 [facility]  10/19/17 6977

## 2017-10-19 NOTE — ANESTHESIA PRE PROCEDURE
[id number]     Type & Screen (If Applicable):  Lab Results   Component Value Date    LABRH POS 10/09/2013       Anesthesia Evaluation    Airway: Mallampati: II       Mouth opening: > = 3 FB Dental:          Pulmonary:   (+) asthma:                            Cardiovascular:        (-) hypertension      Rhythm: regular                      Neuro/Psych:   (+) psychiatric history:            GI/Hepatic/Renal:             Endo/Other:                     Abdominal:   (+) obese,         Vascular:                                      Anesthesia Plan      general     ASA 2       Induction: intravenous. MIPS: Postoperative opioids intended. Anesthetic plan and risks discussed with patient.                       Pola Goodrich MD   10/19/2017

## 2017-10-19 NOTE — ED TRIAGE NOTES
Patient states she has right side lower abdominal pain that radiates to midline above the umbilicus. Patient states it feels like someone is stabbing her. Has vomited x 5 since this morning and has been yellow and now dry heaving.

## 2017-10-19 NOTE — H&P
[facility]  General Surgery History and Physical  Dr. Estrellita Bianchi    Pt Name: Rajeev Morgan  MRN: [id number]  YOB: 1995  Date of evaluation: 10/19/2017  Primary Care Physician: Clyde Bolanos MD  Patient evaluated at the request of  Dr. Lianet Dick  Reason for evaluation: early acute appendicitis    IMPRESSIONS:   1. Acute appendicitis  2. Right lower quadrant abdominal pain  3. Vomiting  4. History of bipolar and depression   does not have any pertinent problems on file. PLANS:   1. Admit type: Observation  2. It is expected this patient's LOS will be: Less than 2 midnights  3. Anticipated Disposition Upon Discharge: Home  4. Analgesics and antiemetics on a prn basis  5. IV hydration  6. Empiric antibiotic coverage  7. DVT prophylaxis with SCD's  8. NPO informed consent  9. Pre-op for surgery  10. Plan for laparoscopic appendectomy this evening. Discussed surgical intervention with patient. Possible risks and complications discussed. Discussed plan of care and length of  stay. Patient tearful regarding surgery, but does agree to proceed. SUBJECTIVE:   Chief Complaint: Right lower quadrant pain    History of Present Illness:  Carlitos Segal is a 23 y.o. female patient who comes from [facility] ambulatory care center for early acute appendicitis. Patient presented to [place] earlier this afternoon for increasing right upper quadrant pain that has now turned into right lower quadrant pain. Describes it as sharp, constant, ache. Radiates to right flank area. Nothing makes it worse or better. She states abdominal pain started in the middle of the night. Patient woke up to vomiting multiple times, but denies any nausea with it. Patient denies any abdominal pain previous to this. She was in normal health before going to bed. No change in appetite. No fevers, chills, or sweats. Normal bowel pattern. No melena or hematochezia. No sob or chest pain. No lightheadedness or dizziness.  No issue with urinating. She is resting comfortably. No generalized abdominal pain. No current nausea or vomiting. No abdominal distention. She is tearful during examination due to the possibility of surgery. She has two little children at home. Only surgical history of right knee. Health history includes depression and mood disorder for which she takes Wellbutrin and Depakote. No anticoagulant use or significant health history. Past Medical History      Diagnosis Date    Asthma not on meds    Depression      Past Surgical History      Procedure Laterality Date    KNEE SURGERY Right      Medications  Prior to Admission medications    Medication Sig Start Date End Date Taking? Authorizing Provider   buPROPion [facility]) 75 MG tablet Take 1 tablet by mouth daily 6/8/17   Kim Ramírez CNP   divalproex (DEPAKOTE) 250 MG DR tablet Take 1 tablet by mouth 2 times daily 6/8/17   Kim Ramírez CNP   traZODone (DESYREL) 50 MG tablet Take 1 tablet by mouth nightly 6/8/17   Kim Ramírez CNP    Scheduled Meds:   sodium chloride  1,000 mL Intravenous Once    ondansetron  4 mg Intravenous Once    ketorolac  30 mg Intravenous Once     Continuous Infusions:   PRN Meds:. Allergies  has No Known Allergies. Family History  family history includes Arthritis in her maternal grandfather, maternal grandmother, and mother; Asthma in her maternal grandmother; Bipolar Disorder in her father; Cancer in her maternal grandmother; Depression in her father, maternal grandmother, and mother; Early Death in her maternal grandmother; High Blood Pressure in her maternal grandmother; Schizophrenia in her father. Social History   reports that she has been smoking Cigarettes. She has been smoking about 0.50 packs per day. She has never used smokeless tobacco. She reports that she does not drink alcohol or use drugs. Review of Systems:  General Denies any fever or chills. No significant unexpected weight change.   HEENT Denies any normal caliber.        There is no pelvic free fluid.    There is no adenopathy.       No suspicious osseous lesions are identified.           Impression   1.   Findings are consistent with early acute appendicitis. No evidence of abscess or perforation at this time.                   **This report has been created using voice recognition software. It may contain minor errors which are inherent in voice recognition technology. **       Final report electronically signed by Dr. Richard Ramos on 10/19/2017 3:08 PM     Electronically signed by Sanket Sifuentes CNP on 10/19/2017 at 5:03 PM    Patient seen and examined independently by me. Above discussed and I agree with Caitlyn Alvarenga CNP. See my additional comments below for updated orders and plan. Labs, cultures, and radiographs where available were reviewed. I discussed patient concerns with the patient's nurse and instructions were given. Please see our orders for the updated patient care plan. - Nothing by mouth. IV antibiotics. Consent. Patient with acute appendicitis on diagnostic imaging and on clinical exam.  Risks, benefits and alternatives discussed with patient in detail. Discussed both conservative/nonoperative treatment plan and appendectomy. She agrees to proceed with surgical correction. Plan on surgery today.     Electronically signed by Stefanie Pearce MD on 10/19/2017 at 6:04 PM

## 2017-10-20 LAB
ANION GAP SERPL CALCULATED.3IONS-SCNC: 14 MEQ/L (ref 8–16)
BUN BLDV-MCNC: 7 MG/DL (ref 7–22)
CALCIUM SERPL-MCNC: 8.4 MG/DL (ref 8.5–10.5)
CHLORIDE BLD-SCNC: 104 MEQ/L (ref 98–111)
CO2: 20 MEQ/L (ref 23–33)
CREAT SERPL-MCNC: 0.6 MG/DL (ref 0.4–1.2)
GFR SERPL CREATININE-BSD FRML MDRD: > 90 ML/MIN/1.73M2
GLUCOSE BLD-MCNC: 148 MG/DL (ref 70–108)
HCT VFR BLD CALC: 39.3 % (ref 37–47)
HEMOGLOBIN: 13.3 GM/DL (ref 12–16)
MCH RBC QN AUTO: 30.1 PG (ref 27–31)
MCHC RBC AUTO-ENTMCNC: 33.8 GM/DL (ref 33–37)
MCV RBC AUTO: 89.1 FL (ref 81–99)
PDW BLD-RTO: 12.8 % (ref 11.5–14.5)
PLATELET # BLD: 173 THOU/MM3 (ref 130–400)
PMV BLD AUTO: 11.2 MCM (ref 7.4–10.4)
POTASSIUM SERPL-SCNC: 4 MEQ/L (ref 3.5–5.2)
RBC # BLD: 4.42 MILL/MM3 (ref 4.2–5.4)
SODIUM BLD-SCNC: 138 MEQ/L (ref 135–145)
WBC # BLD: 11.2 THOU/MM3 (ref 4.8–10.8)

## 2017-10-20 PROCEDURE — 36415 COLL VENOUS BLD VENIPUNCTURE: CPT

## 2017-10-20 PROCEDURE — 80048 BASIC METABOLIC PNL TOTAL CA: CPT

## 2017-10-20 PROCEDURE — 96365 THER/PROPH/DIAG IV INF INIT: CPT

## 2017-10-20 PROCEDURE — 6370000000 HC RX 637 (ALT 250 FOR IP): Performed by: NURSE PRACTITIONER

## 2017-10-20 PROCEDURE — 99024 POSTOP FOLLOW-UP VISIT: CPT | Performed by: SURGERY

## 2017-10-20 PROCEDURE — 96366 THER/PROPH/DIAG IV INF ADDON: CPT

## 2017-10-20 PROCEDURE — 6370000000 HC RX 637 (ALT 250 FOR IP): Performed by: SURGERY

## 2017-10-20 PROCEDURE — 2500000003 HC RX 250 WO HCPCS: Performed by: SURGERY

## 2017-10-20 PROCEDURE — 85027 COMPLETE CBC AUTOMATED: CPT

## 2017-10-20 RX ORDER — IBUPROFEN 800 MG/1
800 TABLET ORAL EVERY 8 HOURS PRN
Status: DISCONTINUED | OUTPATIENT
Start: 2017-10-20 | End: 2017-10-22 | Stop reason: HOSPADM

## 2017-10-20 RX ORDER — KETOROLAC TROMETHAMINE 30 MG/ML
30 INJECTION, SOLUTION INTRAMUSCULAR; INTRAVENOUS EVERY 6 HOURS
Status: DISCONTINUED | OUTPATIENT
Start: 2017-10-20 | End: 2017-10-20

## 2017-10-20 RX ORDER — OXYCODONE HYDROCHLORIDE AND ACETAMINOPHEN 5; 325 MG/1; MG/1
1 TABLET ORAL EVERY 4 HOURS PRN
Status: DISCONTINUED | OUTPATIENT
Start: 2017-10-20 | End: 2017-10-22 | Stop reason: HOSPADM

## 2017-10-20 RX ORDER — OXYCODONE HYDROCHLORIDE AND ACETAMINOPHEN 5; 325 MG/1; MG/1
2 TABLET ORAL EVERY 4 HOURS PRN
Status: DISCONTINUED | OUTPATIENT
Start: 2017-10-20 | End: 2017-10-22 | Stop reason: HOSPADM

## 2017-10-20 RX ORDER — DOCUSATE SODIUM 100 MG/1
100 CAPSULE, LIQUID FILLED ORAL 2 TIMES DAILY PRN
Status: DISCONTINUED | OUTPATIENT
Start: 2017-10-20 | End: 2017-10-22 | Stop reason: HOSPADM

## 2017-10-20 RX ADMIN — IBUPROFEN 800 MG: 800 TABLET, FILM COATED ORAL at 21:22

## 2017-10-20 RX ADMIN — OXYCODONE HYDROCHLORIDE AND ACETAMINOPHEN 2 TABLET: 5; 325 TABLET ORAL at 19:41

## 2017-10-20 RX ADMIN — IBUPROFEN 800 MG: 800 TABLET, FILM COATED ORAL at 12:54

## 2017-10-20 RX ADMIN — OXYCODONE HYDROCHLORIDE AND ACETAMINOPHEN 2 TABLET: 5; 325 TABLET ORAL at 14:28

## 2017-10-20 RX ADMIN — TAZOBACTAM SODIUM AND PIPERACILLIN SODIUM 3.38 G: 375; 3 INJECTION, SOLUTION INTRAVENOUS at 01:02

## 2017-10-20 RX ADMIN — CHLORASEPTIC 1 SPRAY: 1.5 LIQUID ORAL at 12:54

## 2017-10-20 RX ADMIN — DOCUSATE SODIUM 100 MG: 100 CAPSULE ORAL at 21:22

## 2017-10-20 RX ADMIN — HYDROCODONE BITARTRATE AND ACETAMINOPHEN 2 TABLET: 5; 325 TABLET ORAL at 01:02

## 2017-10-20 RX ADMIN — HYDROCODONE BITARTRATE AND ACETAMINOPHEN 2 TABLET: 5; 325 TABLET ORAL at 09:04

## 2017-10-20 RX ADMIN — HYDROCODONE BITARTRATE AND ACETAMINOPHEN 2 TABLET: 5; 325 TABLET ORAL at 05:18

## 2017-10-20 ASSESSMENT — PAIN DESCRIPTION - ORIENTATION
ORIENTATION: LEFT;MID;LOWER
ORIENTATION: LEFT;MID;LOWER
ORIENTATION: LOWER;MID;LEFT
ORIENTATION: LEFT;MID;LOWER
ORIENTATION: LEFT;LOWER;MID
ORIENTATION: LEFT;LOWER;MID

## 2017-10-20 ASSESSMENT — PAIN SCALES - GENERAL
PAINLEVEL_OUTOF10: 7
PAINLEVEL_OUTOF10: 6
PAINLEVEL_OUTOF10: 7
PAINLEVEL_OUTOF10: 5
PAINLEVEL_OUTOF10: 8
PAINLEVEL_OUTOF10: 7
PAINLEVEL_OUTOF10: 5
PAINLEVEL_OUTOF10: 8
PAINLEVEL_OUTOF10: 5
PAINLEVEL_OUTOF10: 7
PAINLEVEL_OUTOF10: 6
PAINLEVEL_OUTOF10: 6
PAINLEVEL_OUTOF10: 4
PAINLEVEL_OUTOF10: 8
PAINLEVEL_OUTOF10: 3

## 2017-10-20 ASSESSMENT — PAIN DESCRIPTION - PAIN TYPE
TYPE: SURGICAL PAIN
TYPE: ACUTE PAIN;SURGICAL PAIN
TYPE: SURGICAL PAIN
TYPE: SURGICAL PAIN

## 2017-10-20 ASSESSMENT — PAIN DESCRIPTION - LOCATION
LOCATION: ABDOMEN

## 2017-10-20 ASSESSMENT — PAIN DESCRIPTION - ONSET
ONSET: ON-GOING

## 2017-10-20 ASSESSMENT — PAIN DESCRIPTION - PROGRESSION
CLINICAL_PROGRESSION: NOT CHANGED
CLINICAL_PROGRESSION: GRADUALLY IMPROVING
CLINICAL_PROGRESSION: NOT CHANGED

## 2017-10-20 ASSESSMENT — PAIN DESCRIPTION - DESCRIPTORS
DESCRIPTORS: ACHING;DISCOMFORT
DESCRIPTORS: ACHING;PRESSURE
DESCRIPTORS: ACHING;DISCOMFORT

## 2017-10-20 ASSESSMENT — PAIN DESCRIPTION - FREQUENCY
FREQUENCY: CONTINUOUS

## 2017-10-20 ASSESSMENT — PAIN DESCRIPTION - DIRECTION: RADIATING_TOWARDS: LEFT FLANK PAIN

## 2017-10-20 NOTE — PROGRESS NOTES
2055  Patient meets PACU D/C criteria at this time. Patient is awake and alert on RA and VSS. Report called to [address].  2115  Patient transported to [address] room 54 on RA.

## 2017-10-20 NOTE — PROGRESS NOTES
Patient arrived to unit from PACU, accompanied by mother and . Patient is awake, alert, and oriented x4. Physical assessment performed, as charted. Patient has three surgical stab sites with steri-strips as dressing; all three are dry and intact. Patient also has ice pack applied to abdomen for comfort. Physician orders and patient plan of care discussed with patient, who verbalized understanding. SCDs are initiated at this time. Patient has pain level of an \"8\" on a 0-10 pain scale; morphine was given at 2035 and will be given again when order verified. Patient has an IV with a 1000 ML bag of 0.9% NS infusing at 100 ML/HR; there is 100 ML infused with 900 ML to be infused. Patient lying in bed, side rails up x2, call light in reach. Patient denies any further questions or concerns at this time.

## 2017-10-20 NOTE — PLAN OF CARE
Problem: SAFETY  Goal: Free from accidental physical injury  Outcome: Met This Shift  Patient is free from falls during this shift. Patient ambulates with non-slip socks on, keeps clear pathway in room with adequate lighting, and bed remains in low position and locked. Problem: DISCHARGE BARRIERS  Goal: Patient's continuum of care needs are met  Outcome: Ongoing  No discharge date has been established, but patient is expected to return to home with  at time of discharge. Problem: Pain Control  Goal: Maintain pain level at or below patient's acceptable level (or 5 if patient is unable to determine acceptable level)  Outcome: Ongoing  Patient rates her pain consistently as a \"6-7\" during this shift and has a pain goal of \"4\" on a 0-10 pain scale. Patient receives one dose of IV morphine this shift; not much pain relief from the medication. Patient switches to oral Norco and verbalized receiving pain relief from the oral medication. Problem: Pain:  Goal: Pain level will decrease  Pain level will decrease   Outcome: Ongoing  Patient is experiencing pain during this shift, but gets relief from oral medication. Goal: Control of acute pain  Control of acute pain   Outcome: Ongoing  Patient has acute pain in the abdomen S/P laparoscopic appendectomy. She is taking oral Norco for the pain after IV morphine did not give much relief. Goal: Control of chronic pain  Control of chronic pain   Outcome: Completed Date Met: 10/20/17      Comments: Care plan reviewed with patient, who verbalized understanding of the plan of care and contributes to goal setting.

## 2017-10-20 NOTE — CARE COORDINATION
10/20/17, 7:58 AM      Orlin Eugene       Admitted from: Procedure 10/19/2017/ [facility] day: 0   Location: [address] [place], [place] [address] Reason for admit: Acute appendicitis [K35.80]  Appendicitis [K37] Status: Out pt in bed  Admit order signed?: yes  PMH:  has a past medical history of Asthma and Depression. Procedure: Laparoscopic appendectomy  Pertinent abnormal Imaging:  10-19-17 CT Abd/Pelvis  1. Findings are consistent with early acute appendicitis. No evidence of abscess or perforation at this time. Medications:  Scheduled Meds:   buPROPion  75 mg Oral Daily    divalproex  250 mg Oral BID    traZODone  50 mg Oral Nightly    enoxaparin  40 mg Subcutaneous Daily    influenza virus vaccine  0.5 mL Intramuscular Once     Continuous Infusions:    Pertinent Info/Orders/Treatment Plan:  VS. I&O. Manage pain. Monitor surgical site. Diet: DIET DENTAL SOFT;   DVT Prophylaxis: Lovenox with SCD's ordered  Smoking status:  reports that she has been smoking Cigarettes. She has been smoking about 0.50 packs per day. She has never used smokeless tobacco.   Influenza Vaccination Screening Completed: yes  Pneumonia Vaccination Screening Completed: yes  Core measures: VTE  PCP: Elkin Milligan MD  Readmission:   No  Risk Score: 2.5     Discharge Planning  Current Residence:  Private Residence  Living Arrangements:  Spouse/Significant Other, Children   Support Systems:  Spouse/Significant Other, Family Members  Current Services PTA:     Potential Assistance Needed:  N/A  Potential Assistance Purchasing Medications:  No  Does patient want to participate in local refill/ meds to beds program?  N/A  Type of Home Care Services:  None  Patient expects to be discharged to:  home  Expected Discharge date:  10/20/17  Follow Up Appointment: Best Day/ Time:      Discharge Plan: Spoke with pt this morning. Comes from home with spouse. No services or DME's. She drives, she has a PCP and no issues getting meds.

## 2017-10-20 NOTE — PLAN OF CARE
Problem: SAFETY  Goal: Free from accidental physical injury  Outcome: Met This Shift  No falls this shift. Safety interventions maintained. Problem: DISCHARGE BARRIERS  Goal: Patient's continuum of care needs are met  Outcome: Ongoing  No discharge plans yet,  Will continue to monitor for discharge needs     Problem: Pain Control  Goal: Maintain pain level at or below patient's acceptable level (or 5 if patient is unable to determine acceptable level)  Outcome: Ongoing  Patient is now on motrin and percocet for pain,  Controlling it better than the norco     Problem: Pain:  Goal: Pain level will decrease  Pain level will decrease   Outcome: Ongoing  Patient's pain level has gone from an 8 to a 6, taking percocet and motrin for pain   Goal: Control of acute pain  Control of acute pain   Outcome: Ongoing      Comments: Care plan reviewed with patient. Patient verbalize understanding of the plan of care and contribute to goal setting.

## 2017-10-20 NOTE — PROGRESS NOTES
(24h): Pulse  Av.1  Min: 58  Max: 104  Respiration Range (24h): Resp  Av.3  Min: 6  Max: 51  Current Pulse Ox (24h):  SpO2: 98 %  Pulse Ox Range (24h):  SpO2  Av.2 %  Min: 96 %  Max: 100 %  Oxygen Amount and Delivery:    Incentive Spirometry Tx:            GENERAL: alert, no distress  LUNGS: clear to ausculation, without wheezes, rales or rhonci  HEART: normal rate and regular rhythm  ABDOMEN: softly distended, incisional tenderness, bowel sounds hypoactive in all 4 quadrants and no guarding or peritoneal signs  INCISION: healing well, no significant drainage, no significant erythema, bruising to LLQ incision  EXTERMITY: no cyanosis, clubbing or edema  In: 2873.4 [P.O.:1110; I.V.:1763.4]  Out: 20     Date 10/20/17 0000 - 10/20/17 2359   Shift 5912-5805 2072-4714 6597-4034 24 Hour Total   I  N  T  A  K  E   P.O. 1110   1110    I.V.  (mL/kg/hr) 763.4  (1)   763.4    Shift Total  (mL/kg) 1873.4  (18.8)   1873.4  (18.8)   O  U  T  P  U  T   Shift Total  (mL/kg)       Weight (kg) 99.8 99.8 99.8 99.8     LABS     Recent Labs      10/19/17   1240  10/20/17   0524   WBC  15.0*  11.2*   HGB  13.7  13.3   HCT  42.6  39.3   PLT  185  173   NA   --   138   K   --   4.0   CL   --   104   CO2   --   20*   BUN   --   7   CREATININE   --   0.6   CALCIUM   --   8.4*      No results for input(s): PTT, INR in the last 72 hours. Invalid input(s): PT  Recent Labs      10/19/17   1604   AST  14   ALT  14   BILITOT  0.9   BILIDIR  <0.2     RADIOLOGY   No new imaging. Electronically signed by Trish Edwards CNP on 10/20/2017 at 1:49 PM     Patient seen and examined independently by me earlier this AM. Above discussed and I agree with Jessa Byrd CNP. See my additional comments below for updated orders and plan. Labs, cultures, and radiographs where available were reviewed. I discussed patient concerns with the patient's nurse and instructions were given.  Please see our orders for the updated patient care plan.  - Advance diet. Decrease IV fluids. Pain control. Ambulate. Nausea control. Home tomorrow.     Electronically signed by Osoroi Carmen MD on 10/20/2017 at 4:17 PM

## 2017-10-20 NOTE — OP NOTE
was  induced. She tolerated this well throughout the case. All pressure  points were padded. She was on preoperative antibiotics. Bilateral  lower extremity sequential compression devices were placed prior to  the incision. Her abdomen and pelvis were prepped and draped in the  usual sterile standard fashion. A time-out occurred prior to the  operation, which was done not only to identify the patient, but also  the planned procedure to be performed. At the end of the time-out,  there were no questions or concerns. I began the operation by making  a small transverse supraumbilical skin incision with 11 blade scalpel. Fascia was elevated, and a Veress needle inserted. Intraabdominal  cavity was insufflated to a pressure of approximately 15 mmHg with  carbon dioxide gas. The patient tolerated the insufflation well. A  11 mm trocar was placed. Laparoscope was inserted. Upon initial  evaluation, there was no hollow viscus, solid organ, or major vascular  injury with the Veress needle insertion or with first trocar  placement. A 5 mm trocar was placed in the suprapubic position. A 12  mm trocar was placed in the left lateral abdominal wall region under  direct vision. The patient was placed in Trendelenburg with the right  side elevated. Cecum and terminal ileum were identified. The tenia  was identified, and this was tracked back to the base of the appendix. The appendix appeared to be acutely inflamed. No rupture or abscess. No purulence surrounding the appendix, but there was a lot of  inflammatory changes. The area of concern was very friable as the  appendix was mobilized. Eventually, the base of the appendix was  identified and elevated as a small defect was made between the  mesoappendix and the base of the appendix itself. Using [place]  LUZ MARIA-45 white cartridge, came across base of the appendix as it was  coming up onto the cecum.   The mesoappendix was then taken with a  vascular cartridge

## 2017-10-20 NOTE — ANESTHESIA POSTPROCEDURE EVALUATION
Department of Anesthesiology  Postprocedure Note    Patient: Emanuel Scales  MRN: [id number]  YOB: 1995  Date of evaluation: 10/19/2017  Time:  8:27 PM     Procedure Summary     Date:  10/19/17 Room / Location:  [address] [place] [address] WALLACE Joshi    Anesthesia Start:  [facility] Anesthesia Stop:  1942    Procedure:  APPENDECTOMY LAPAROSCOPIC (N/A Abdomen) Diagnosis:  (ABDOMINAL PAIN)    Surgeon:  Chris Garcia MD Responsible Provider:  Katie Mejia DO    Anesthesia Type:  general ASA Status:  2          Anesthesia Type: No value filed. Shaun Phase I: Shaun Score: 9    Shaun Phase II:      Last vitals: Reviewed and per EMR flowsheets.        Anesthesia Post Evaluation    Patient location during evaluation: PACU  Patient participation: complete - patient participated  Level of consciousness: awake  Airway patency: patent  Nausea & Vomiting: no nausea and no vomiting  Complications: no  Cardiovascular status: hemodynamically stable  Respiratory status: acceptable  Hydration status: euvolemic

## 2017-10-21 PROCEDURE — 99024 POSTOP FOLLOW-UP VISIT: CPT | Performed by: NURSE PRACTITIONER

## 2017-10-21 PROCEDURE — 6370000000 HC RX 637 (ALT 250 FOR IP): Performed by: NURSE PRACTITIONER

## 2017-10-21 PROCEDURE — 99024 POSTOP FOLLOW-UP VISIT: CPT | Performed by: SURGERY

## 2017-10-21 RX ORDER — DIAZEPAM 5 MG/1
5 TABLET ORAL EVERY 8 HOURS PRN
Status: DISCONTINUED | OUTPATIENT
Start: 2017-10-21 | End: 2017-10-22 | Stop reason: HOSPADM

## 2017-10-21 RX ORDER — PSEUDOEPHEDRINE HCL 30 MG
100 TABLET ORAL 2 TIMES DAILY PRN
Qty: 30 CAPSULE | Refills: 0 | Status: SHIPPED | OUTPATIENT
Start: 2017-10-21 | End: 2017-11-01

## 2017-10-21 RX ADMIN — OXYCODONE HYDROCHLORIDE AND ACETAMINOPHEN 2 TABLET: 5; 325 TABLET ORAL at 18:30

## 2017-10-21 RX ADMIN — OXYCODONE HYDROCHLORIDE AND ACETAMINOPHEN 2 TABLET: 5; 325 TABLET ORAL at 22:22

## 2017-10-21 RX ADMIN — DOCUSATE SODIUM 100 MG: 100 CAPSULE ORAL at 09:29

## 2017-10-21 RX ADMIN — DOCUSATE SODIUM 100 MG: 100 CAPSULE ORAL at 21:11

## 2017-10-21 RX ADMIN — OXYCODONE HYDROCHLORIDE AND ACETAMINOPHEN 2 TABLET: 5; 325 TABLET ORAL at 09:29

## 2017-10-21 RX ADMIN — OXYCODONE HYDROCHLORIDE AND ACETAMINOPHEN 2 TABLET: 5; 325 TABLET ORAL at 05:41

## 2017-10-21 RX ADMIN — IBUPROFEN 800 MG: 800 TABLET, FILM COATED ORAL at 16:11

## 2017-10-21 RX ADMIN — DIAZEPAM 5 MG: 5 TABLET ORAL at 12:02

## 2017-10-21 RX ADMIN — OXYCODONE HYDROCHLORIDE AND ACETAMINOPHEN 2 TABLET: 5; 325 TABLET ORAL at 14:20

## 2017-10-21 RX ADMIN — OXYCODONE HYDROCHLORIDE AND ACETAMINOPHEN 2 TABLET: 5; 325 TABLET ORAL at 00:24

## 2017-10-21 RX ADMIN — DIAZEPAM 5 MG: 5 TABLET ORAL at 21:11

## 2017-10-21 ASSESSMENT — PAIN SCALES - GENERAL
PAINLEVEL_OUTOF10: 7
PAINLEVEL_OUTOF10: 6
PAINLEVEL_OUTOF10: 7
PAINLEVEL_OUTOF10: 7
PAINLEVEL_OUTOF10: 8
PAINLEVEL_OUTOF10: 5
PAINLEVEL_OUTOF10: 7
PAINLEVEL_OUTOF10: 7
PAINLEVEL_OUTOF10: 5
PAINLEVEL_OUTOF10: 6
PAINLEVEL_OUTOF10: 7
PAINLEVEL_OUTOF10: 6
PAINLEVEL_OUTOF10: 7

## 2017-10-21 ASSESSMENT — PAIN DESCRIPTION - LOCATION
LOCATION: ABDOMEN;FLANK

## 2017-10-21 ASSESSMENT — PAIN DESCRIPTION - ORIENTATION
ORIENTATION: LEFT;LOWER;MID
ORIENTATION: LEFT;MID;LOWER
ORIENTATION: LEFT;LOWER;MID

## 2017-10-21 ASSESSMENT — PAIN DESCRIPTION - PAIN TYPE
TYPE: SURGICAL PAIN
TYPE: ACUTE PAIN
TYPE: SURGICAL PAIN

## 2017-10-21 ASSESSMENT — PAIN DESCRIPTION - FREQUENCY
FREQUENCY: CONTINUOUS

## 2017-10-21 ASSESSMENT — PAIN DESCRIPTION - DESCRIPTORS
DESCRIPTORS: SHARP;CONSTANT
DESCRIPTORS: CONSTANT;SHARP
DESCRIPTORS: CONSTANT;SHARP
DESCRIPTORS: SHARP;STABBING
DESCRIPTORS: CONSTANT;SHARP
DESCRIPTORS: SHARP;STABBING
DESCRIPTORS: CONSTANT;SHARP
DESCRIPTORS: SHARP;STABBING
DESCRIPTORS: CONSTANT;SHARP

## 2017-10-21 ASSESSMENT — PAIN DESCRIPTION - ONSET
ONSET: ON-GOING

## 2017-10-21 ASSESSMENT — PAIN DESCRIPTION - PROGRESSION
CLINICAL_PROGRESSION: NOT CHANGED

## 2017-10-21 ASSESSMENT — PAIN DESCRIPTION - DIRECTION: RADIATING_TOWARDS: LEFT FLANK

## 2017-10-21 NOTE — PROGRESS NOTES
(88U): Systolic (98RAS), NYS:202 , Min:93 , RQJ:846     Diastolic (25ART), AKH:10, Min:62, Max:70    Pulse Range (24h): Pulse  Av  Min: 62  Max: 70  Respiration Range (24h): Resp  Av.5  Min: 16  Max: 20  Current Pulse Ox (24h):  SpO2: 99 %  Pulse Ox Range (24h):  SpO2  Av.3 %  Min: 98 %  Max: 99 %  Oxygen Amount and Delivery:    Incentive Spirometry Tx:     Incentive Spirometry Goal (mL): 3000 mL Incentive Spirometry Achieved (mL): 3200 mL    GENERAL: alert, no distress  LUNGS: clear to ausculation, without wheezes, rales or rhonci  HEART: normal rate and regular rhythm  ABDOMEN: softly distended, incisional tenderness - sharp pain on left incision, bowel sounds active in all 4 quadrants and no guarding or peritoneal signs  INCISION: healing well, no significant drainage, no significant erythema, bruising to LLQ incision  EXTERMITY: no cyanosis, clubbing or edema  In: 1340 [P.O.:1340]  Out: -     Date 10/21/17 0000 - 10/21/17 2359   Shift 1633-5445 7480-9164 0249-2679 24 Hour Total   I  N  T  A  K  E   P.O. 400   400    I.V.  (mL/kg/hr) 0  (0)   0    Shift Total  (mL/kg) 400  (4)   400  (4)   O  U  T  P  U  T   Shift Total  (mL/kg)       Weight (kg) 99.8 99.8 99.8 99.8     LABS     Recent Labs      10/19/17   1240  10/20/17   0524   WBC  15.0*  11.2*   HGB  13.7  13.3   HCT  42.6  39.3   PLT  185  173   NA   --   138   K   --   4.0   CL   --   104   CO2   --   20*   BUN   --   7   CREATININE   --   0.6   CALCIUM   --   8.4*      No results for input(s): PTT, INR in the last 72 hours. Invalid input(s): PT  Recent Labs      10/19/17   1604   AST  14   ALT  14   BILITOT  0.9   BILIDIR  <0.2     RADIOLOGY   No new imaging. Electronically signed by Maryann Li CNP on 10/21/2017 at 9:49 AM     Patient seen and examined independently by me early this AM. Above discussed and I agree with Jessica Joiner CNP. See my additional comments below for updated orders and plan.  Labs, cultures, and

## 2017-10-21 NOTE — PLAN OF CARE
Problem: SAFETY  Goal: Free from accidental physical injury  Outcome: Met This Shift      Problem: DISCHARGE BARRIERS  Goal: Patient's continuum of care needs are met  Outcome: Ongoing  No discharge date has been established at this time. Patient is expected to return to home with  at time of discharge. No discharge needs are voiced by patient at this time. Patient does have some concerns about the pain medication not controlling pain once she is home. Will address the issue with physician. Problem: Pain Control  Goal: Maintain pain level at or below patient's acceptable level (or 5 if patient is unable to determine acceptable level)  Outcome: Ongoing  Patient continues to have pain near the surgical incisions and is taking oral percocet and motrin for pain control. Patient does verbalize getting \"some\" relief from the pain medications. RN encouraged patient to use incentive spirometer and to increase ambulation. Patient is also given a PRN colace to help with issues of constipation following surgery or with use of narcotics. Problem: Pain:  Goal: Pain level will decrease  Pain level will decrease   Outcome: Ongoing  Patient pain level does decrease following intervention, but she continues to have sharp, stabbing pains in the LMQ and LLQ that radiates to the left flank. Goal: Control of acute pain  Control of acute pain   Outcome: Ongoing  Acute pain associated with the surgical incisions present; patient taking oral medications for relief. Patient also applies ice pack to abdomen to help decrease pain. Comments: Care plan reviewed with patient, who verbalized understanding of the plan of care and contribute to goal setting.

## 2017-10-21 NOTE — PROGRESS NOTES
Pt has c/o L abdominal/flank pain at L surgical stab site t/o shift and states minimal relief with comfort measures. Pain goal of \"4\" on scale of \"1-10\" has not been achieved this shift. Pt states not ready for discharge to home this shift RT poor pain control.

## 2017-10-22 VITALS
TEMPERATURE: 97.6 F | HEART RATE: 62 BPM | SYSTOLIC BLOOD PRESSURE: 118 MMHG | RESPIRATION RATE: 16 BRPM | OXYGEN SATURATION: 99 % | BODY MASS INDEX: 35.36 KG/M2 | HEIGHT: 66 IN | DIASTOLIC BLOOD PRESSURE: 71 MMHG | WEIGHT: 220 LBS

## 2017-10-22 PROCEDURE — 6370000000 HC RX 637 (ALT 250 FOR IP): Performed by: NURSE PRACTITIONER

## 2017-10-22 PROCEDURE — 99999 PR OFFICE/OUTPT VISIT,PROCEDURE ONLY: CPT | Performed by: NURSE PRACTITIONER

## 2017-10-22 PROCEDURE — 99024 POSTOP FOLLOW-UP VISIT: CPT | Performed by: SURGERY

## 2017-10-22 RX ORDER — KETOROLAC TROMETHAMINE 10 MG/1
10 TABLET, FILM COATED ORAL EVERY 8 HOURS PRN
Qty: 15 TABLET | Refills: 0 | Status: SHIPPED | OUTPATIENT
Start: 2017-10-22 | End: 2017-11-01

## 2017-10-22 RX ORDER — DIAZEPAM 5 MG/1
5 TABLET ORAL EVERY 12 HOURS PRN
Qty: 10 TABLET | Refills: 0 | Status: SHIPPED | OUTPATIENT
Start: 2017-10-22 | End: 2017-11-01

## 2017-10-22 RX ORDER — OXYCODONE HYDROCHLORIDE AND ACETAMINOPHEN 5; 325 MG/1; MG/1
1 TABLET ORAL EVERY 4 HOURS PRN
Qty: 30 TABLET | Refills: 0 | Status: SHIPPED | OUTPATIENT
Start: 2017-10-22 | End: 2017-11-01

## 2017-10-22 RX ADMIN — DOCUSATE SODIUM 100 MG: 100 CAPSULE ORAL at 09:02

## 2017-10-22 RX ADMIN — IBUPROFEN 800 MG: 800 TABLET, FILM COATED ORAL at 00:14

## 2017-10-22 RX ADMIN — IBUPROFEN 800 MG: 800 TABLET, FILM COATED ORAL at 06:22

## 2017-10-22 RX ADMIN — OXYCODONE HYDROCHLORIDE AND ACETAMINOPHEN 2 TABLET: 5; 325 TABLET ORAL at 06:21

## 2017-10-22 ASSESSMENT — PAIN DESCRIPTION - FREQUENCY
FREQUENCY: CONTINUOUS
FREQUENCY: CONTINUOUS

## 2017-10-22 ASSESSMENT — PAIN DESCRIPTION - DESCRIPTORS
DESCRIPTORS: SHARP
DESCRIPTORS: SHARP

## 2017-10-22 ASSESSMENT — PAIN DESCRIPTION - PAIN TYPE
TYPE: SURGICAL PAIN
TYPE: SURGICAL PAIN

## 2017-10-22 ASSESSMENT — PAIN SCALES - GENERAL
PAINLEVEL_OUTOF10: 3
PAINLEVEL_OUTOF10: 7
PAINLEVEL_OUTOF10: 8
PAINLEVEL_OUTOF10: 6
PAINLEVEL_OUTOF10: 7

## 2017-10-22 ASSESSMENT — PAIN DESCRIPTION - ORIENTATION
ORIENTATION: LEFT
ORIENTATION: LEFT

## 2017-10-22 ASSESSMENT — PAIN DESCRIPTION - LOCATION
LOCATION: ABDOMEN;FLANK
LOCATION: ABDOMEN;FLANK

## 2017-10-22 NOTE — PROGRESS NOTES
[facility]  Postoperative Progress Note    Pt Name: Abby Ryan  Medical Record Number: [id number]  Date of Birth 1995   Today's Date: 10/22/2017    ASSESSMENT   1. POD # 3 Status post laparoscopic appendectomy  2. Postoperative abdominal pain  3. History of bipolar and depression  4. Leukocytosis improving   has a past medical history of Asthma and Depression. PLAN   1. Regular diet. Bowel function returning. 2. IV off   3. Pain & nausea control. Patient states this is better today. 4. Abdominal binder  5. Ice and heat to abdomen as needed  6. Incentive spirometer  7. Stool softeners  8. DVT prophylaxis with SCDs and Lovenox  9. Up ad martha  10. Routine wound care   11. Patient clinically looks good. She states pain is better controlled. Ambulating better. Celi Barrientos for discharge today. Follow-up in office  SUBJECTIVE   Patient was stable overnight. Chart reviewed. Updated by nursing staff. Denies chest discomfort or dyspnea. No N/V. (+) belching. (+) flatus. Tolerating DIET GENERAL; diet. Better controlled today. Ambulating better. Left incision still the one with pain -not near as sharp. More burning. Shirley Setel Up ad martha. Incisions clean, dry and intact. Some bruising noted and unchanged. CURRENT MEDICATIONS   Scheduled Meds:   enoxaparin  40 mg Subcutaneous Daily     Continuous Infusions:   PRN Meds:.diazepam, phenol, oxyCODONE-acetaminophen **OR** oxyCODONE-acetaminophen, ibuprofen, docusate sodium, acetaminophen  OBJECTIVE   CURRENT VITALS:  height is 5' 6\" (1.676 m) and weight is 220 lb (99.8 kg). Her oral temperature is 97.5 °F (36.4 °C). Her blood pressure is 111/67 and her pulse is 58. Her respiration is 20 and oxygen saturation is 99%.    Temperature Range (24h):Temp: 97.5 °F (36.4 °C) Temp  Av °F (36.7 °C)  Min: 97.5 °F (36.4 °C)  Max: 98.2 °F (36.8 °C)  BP Range (59J): Systolic (05VMS), DDR:161 , Min:100 , BZT:361     Diastolic (35IBB), NYJ:99,

## 2017-10-22 NOTE — PLAN OF CARE
Problem: Pain Control  Goal: Maintain pain level at or below patient's acceptable level (or 5 if patient is unable to determine acceptable level)  Pt continues with surgical site pain. RX for pain meds provided on discharge    Comments: Care plan reviewed with patient. Patient verbalizes understanding of the plan of care and contribute to goal setting.

## 2017-10-22 NOTE — PLAN OF CARE
Problem: DISCHARGE BARRIERS  Goal: Patient's continuum of care needs are met  Outcome: Met This Shift  Discharge orders received and pt states ready for discharge. Discharge instructions given to pt and pt states understanding of all. Prescriptions provided and follow-up appointment instruction given. Will discharge to home this shift. Comments: Care plan reviewed with patient. Patient  verbalizes understanding of the plan of care and contribute to goal setting.

## 2017-10-23 ENCOUNTER — TELEPHONE (OUTPATIENT)
Dept: FAMILY MEDICINE CLINIC | Age: 22
End: 2017-10-23

## 2017-10-23 NOTE — DISCHARGE SUMMARY
grandfather, maternal grandmother, and mother; Asthma in her maternal grandmother; Bipolar Disorder in her father; Cancer in her maternal grandmother; Depression in her father, maternal grandmother, and mother; Early Death in her maternal grandmother; High Blood Pressure in her maternal grandmother; Schizophrenia in her father. Social History   reports that she has been smoking Cigarettes. She has been smoking about 0.50 packs per day. She has never used smokeless tobacco. She reports that she does not drink alcohol or use drugs. Review of Systems:  General Denies any fever or chills. No significant unexpected weight change. HEENT Denies any diplopia, tinnitus or vertigo. No chronic headaches. Resp Denies any shortness of breath, cough or wheezing  Cardiac Denies any chest pain, palpitations, claudication or edema  GI Positive for vomiting and right lower quadrant pain. Denies any melena, hematochezia, hematemesis or pyrosis   Denies any frequency, urgency, hesitancy or incontinence  Heme Denies bruising or bleeding easily  Endocrine Denies any history of diabetes or thyroid disease  Neuro Denies any focal motor or sensory deficits  Musculoskeletal  Denies osteoarthritis. No gout. No weakness. Psychiatric  History of severe depression and anxiety. No panic attacks. No suicidal ideation. OBJECTIVE:   CURRENT VITALS:  height is 5' 6\" (1.676 m) and weight is 220 lb (99.8 kg). Her oral temperature is 98.6 °F (37 °C). Her blood pressure is 100/51 (abnormal) and her pulse is 90. Her respiration is 17 and oxygen saturation is 100%.    Temperature Range (24h):Temp: 98.6 °F (37 °C) Temp  Av.6 °F (37 °C)  Min: 98.5 °F (36.9 °C)  Max: 98.6 °F (37 °C)  BP Range (58S): Systolic (74SYX), FHU:228 , Min:100 , EHR:319     Diastolic (14WAU), GZ, Min:51, Max:68     Pulse Range (24h): Pulse  Av  Min: 88  Max: 95  Respiration Range (24h): Resp  Av.7  Min: 17  Max: 18  Current Pulse Ox (24h):  SpO2: 100 %  Pulse Ox Range (24h):  SpO2  Av.7 %  Min: 99 %  Max: 100 %  Oxygen Amount and Delivery:    CONSTITUTIONAL: Alert and oriented times 3, no acute distress and cooperative to examination with proper mood and affect. Tearful regarding surgery. SKIN: Skin color, texture, turgor normal. No rashes or lesions. LYMPH: no cervical nodes, no inguinal nodes  HEENT: Head is normocephalic, atraumatic. NECK: Supple, symmetrical, trachea midline, no adenopathy, thyroid symmetric, not enlarged and no tenderness, skin normal.  CHEST/LUNGS: normal respiratory rate and rhythm, lungs clear to auscultation without wheezes, rales or rhonchi. No accessory muscle use. CARDIOVASCULAR: Heart sounds are normal.  Regular rate and rhythm without murmur, gallop or rub. Normal S1 and S2.  ABDOMEN: Normal shape. No and Laparoscopic scar(s) present. Normal bowel sounds. No bruits. soft, nondistended, no masses or organomegaly. no evidence of hernia. Tenderness: right lower quadrant tenderness  NEUROLOGIC: There are no focalizing motor or sensory deficits. CN II-XII are grossly intact. EXTREMITIES: no cyanosis, no clubbing and no edema.   LABS:           Recent Labs      10/19/17   1240  10/19/17   1604   WBC  15.0*   --    HGB  13.7   --    HCT  42.6   --    PLT  185   --    AST   --   14   ALT   --   14   BILITOT   --   0.9   BILIDIR   --   <0.2      RADIOLOGY:   I have personally reviewed the following films:     PROCEDURE: CT ABDOMEN PELVIS W IV CONTRAST       CLINICAL INFORMATION: ABDOMINAL PAIN, RLQ, elevated WBC .       COMPARISON: None.       TECHNIQUE: 5 mm axial CT images were obtained through the abdomen and pelvis after the administration of intravenous and oral contrast. Coronal and sagittal reconstructions were obtained.       All CT scans at this facility use dose modulation, iterative reconstruction, and/or weight-based dosing when appropriate to reduce radiation dose to as low as reasonably achievable.       FINDINGS: surgery today.     Electronically signed by Stefanie Pearce MD on 10/19/2017 at 6:04 PM     Procedures/Diagnostic Tests:       OPERATIVE REPORT     PATIENT NAME: Young Yan                     :             1995  MED REC NO:   [id number]                            ROOM:            9107  ACCOUNT NO:   [de-identified]                            ADMISSION DATE:  10/19/2017  PROVIDER:     Lauri Dacosta M.D.     DATE OF PROCEDURE:  10/19/2017     PREOPERATIVE DIAGNOSIS:  Acute appendicitis.     POSTOPERATIVE DIAGNOSIS:  Acute appendicitis.     OPERATION PERFORMED:  Laparoscopic appendectomy.     SURGEON:  Lauri Dacosta M.D.     ANESTHESIA:  General/local.     ESTIMATED BLOOD LOSS:  20 mL.     DRAINS:  None.     COMPLICATIONS:  None.     DISPOSITION:  Stable to the recovery room.     INDICATIONS:  The patient is a 78-year-old female, whom I saw in the  emergency department secondary to right lower quadrant abdominal pain. She was found to have acute appendicitis both clinically and on  diagnostic imaging. Recommendation was for appendectomy. Both  operative and nonoperative intervention plans were discussed. She  understood and wished to proceed with surgery. The risks of surgery  were then further discussed. Some of the risks included, but were not  limited to bleeding, infection, the need for reoperation, severe  chronic postoperative pain or numbness, major vascular or nerve  injury, cardiopulmonary complications, anesthetic complications,  seroma or hematoma formation, wound breakdown, trocar site herniation,  chronic pain, and death. After all of the questions were answered in  their entirety and the patient was completely aware of the current  situation, she elected to proceed with the procedure.     DESCRIPTION OF THE PROCEDURE:  After informed consent was signed and  placed on the chart, the patient was taken back to the operating room,  placed supine on the operating room table.   General anesthesia was  induced. She tolerated this well throughout the case. All pressure  points were padded. She was on preoperative antibiotics. Bilateral  lower extremity sequential compression devices were placed prior to  the incision. Her abdomen and pelvis were prepped and draped in the  usual sterile standard fashion. A time-out occurred prior to the  operation, which was done not only to identify the patient, but also  the planned procedure to be performed. At the end of the time-out,  there were no questions or concerns. I began the operation by making  a small transverse supraumbilical skin incision with 11 blade scalpel. Fascia was elevated, and a Veress needle inserted. Intraabdominal  cavity was insufflated to a pressure of approximately 15 mmHg with  carbon dioxide gas. The patient tolerated the insufflation well. A  11 mm trocar was placed. Laparoscope was inserted. Upon initial  evaluation, there was no hollow viscus, solid organ, or major vascular  injury with the Veress needle insertion or with first trocar  placement. A 5 mm trocar was placed in the suprapubic position. A 12  mm trocar was placed in the left lateral abdominal wall region under  direct vision. The patient was placed in Trendelenburg with the right  side elevated. Cecum and terminal ileum were identified. The tenia  was identified, and this was tracked back to the base of the appendix. The appendix appeared to be acutely inflamed. No rupture or abscess. No purulence surrounding the appendix, but there was a lot of  inflammatory changes. The area of concern was very friable as the  appendix was mobilized. Eventually, the base of the appendix was  identified and elevated as a small defect was made between the  mesoappendix and the base of the appendix itself. Using [place]  LUZ MARIA-45 white cartridge, came across base of the appendix as it was  coming up onto the cecum.   The mesoappendix was then taken with with  and kids. Discharge Condition: stable    Disposition: home    Labs:   Lab Results   Component Value Date    WBC 11.2 (H) 10/20/2017    HGB 13.3 10/20/2017    HCT 39.3 10/20/2017    MCV 89.1 10/20/2017     10/20/2017     Lab Results   Component Value Date     10/20/2017    K 4.0 10/20/2017     10/20/2017    CO2 20 10/20/2017    BUN 7 10/20/2017    CREATININE 0.6 10/20/2017    GLUCOSE 148 10/20/2017    GLUCOSE 93 01/20/2016    CALCIUM 8.4 10/20/2017        PATHOLOGY REPORT    Clinical Information: ABDOMINAL PAIN    FINAL DIAGNOSIS:  Appendix, resection:   Acute suppurative appendicitis and periappendicitis. Specimen:  APPENDIX      Gross Examination:  The container is labeled Zeny Escamillacelia, appendix.   Received in  formalin is a vermiform appendix measuring 6 cm in length x 0.6 cm in  diameter.  The surface is red-vargas. Elsworth Signs is focal fibrinous exudate. Sections through the appendix reveal a patent lumen.  There are no  masses, perforations or fecaliths.  Representative sections including  the proximal resection line which is designated by a vertical knife  leon are submitted.  1 ss.   ALP/DKR:lgl    Microscopic Examination:  Sections demonstrate acute suppurative appendicitis and  periappendicitis.  No rupture is identified microscopically.  There is  no evidence of malignancy. Discharge Instructions:  Name: Grecia Danielson  Medical Record Number: [id number]  Today's Date: 10/21/2017    GENERAL ANESTHESIA OR SEDATION  1. Do not drive or operate hazardous machinery for 24 hours. 2. Do not make important business or personal decisions for 24 hours. 3. Do not drink alcoholic beverages or use tobacco for 24 hours. ACTIVITY INSTRUCTIONS:  [] Rest today. Resume light to normal activity tomorrow.   [] You may resume normal activity tomorrow. Do not engage in strenuous activity that may place stress on your incision.   [x] Do not drive for 3-5 days or while taking the pain

## 2017-10-24 NOTE — TELEPHONE ENCOUNTER
Have the medications prescribed at discharge been filled? yes  Does the patient understand what the medications are for? yes  Has the patient experienced any new symptoms or have previous symptoms worsened? no  Is the patient experiencing any pain? yes If yes, is the pain well controlled? No - left side pain -- will monitor. If worsens, will contact the office or go to ED. We want to be sure the patient has the best recovery possible. Does the patient understand all the discharge instructions? yes        [facility] Transitions Initial Follow Up Call    Call within 2 business days of discharge: Yes    Patient: Rosibel Huber Patient : 1995   MRN: [id number]  Reason for Admission: There are no discharge diagnoses documented for the most recent discharge.   Discharge Date: 10/22/17 RARS: Topher @MAI([id number])@     Spoke with: Pt - [address]: [unfilled]    Non-face-to-face services provided:  Obtained and reviewed discharge summary and/or continuity of care documents    Follow Up  Future Appointments  Date Time Provider Tiago Gilmore   10/26/2017 4:30 PM Ale Bradshaw  [facility]   2017 2:30 PM Carrie Olson, [address] [place] [address]

## 2017-10-26 ENCOUNTER — OFFICE VISIT (OUTPATIENT)
Dept: FAMILY MEDICINE CLINIC | Age: 22
End: 2017-10-26
Payer: COMMERCIAL

## 2017-10-26 ENCOUNTER — HOSPITAL ENCOUNTER (OUTPATIENT)
Dept: GENERAL RADIOLOGY | Age: 22
Discharge: HOME | End: 2017-10-26
Payer: COMMERCIAL

## 2017-10-26 ENCOUNTER — HOSPITAL ENCOUNTER (OUTPATIENT)
Age: 22
Discharge: HOME | End: 2017-10-26
Payer: COMMERCIAL

## 2017-10-26 VITALS
BODY MASS INDEX: 35.15 KG/M2 | WEIGHT: 217.8 LBS | HEART RATE: 76 BPM | SYSTOLIC BLOOD PRESSURE: 118 MMHG | DIASTOLIC BLOOD PRESSURE: 60 MMHG | RESPIRATION RATE: 14 BRPM

## 2017-10-26 DIAGNOSIS — R10.32 LEFT LOWER QUADRANT PAIN: ICD-10-CM

## 2017-10-26 DIAGNOSIS — Z90.49 S/P APPENDECTOMY: ICD-10-CM

## 2017-10-26 DIAGNOSIS — Z90.49 S/P APPENDECTOMY: Primary | ICD-10-CM

## 2017-10-26 LAB
HCT VFR BLD CALC: 41.5 % (ref 37–47)
HEMOGLOBIN: 13.6 GM/DL (ref 12–16)
MCH RBC QN AUTO: 29 PG (ref 27–31)
MCHC RBC AUTO-ENTMCNC: 32.8 GM/DL (ref 33–37)
MCV RBC AUTO: 88.3 FL (ref 81–99)
PDW BLD-RTO: 13.8 % (ref 11.5–14.5)
PLATELET # BLD: 231 THOU/MM3 (ref 130–400)
PMV BLD AUTO: 11.5 MCM (ref 7.4–10.4)
RBC # BLD: 4.7 MILL/MM3 (ref 4.2–5.4)
WBC # BLD: 10.8 THOU/MM3 (ref 4.8–10.8)

## 2017-10-26 PROCEDURE — 85027 COMPLETE CBC AUTOMATED: CPT

## 2017-10-26 PROCEDURE — 99214 OFFICE O/P EST MOD 30 MIN: CPT | Performed by: FAMILY MEDICINE

## 2017-10-26 PROCEDURE — 36415 COLL VENOUS BLD VENIPUNCTURE: CPT

## 2017-10-26 PROCEDURE — 74020 XR ABDOMEN STANDARD: CPT

## 2017-10-26 ASSESSMENT — PATIENT HEALTH QUESTIONNAIRE - PHQ9
1. LITTLE INTEREST OR PLEASURE IN DOING THINGS: 0
SUM OF ALL RESPONSES TO PHQ9 QUESTIONS 1 & 2: 0
2. FEELING DOWN, DEPRESSED OR HOPELESS: 0
SUM OF ALL RESPONSES TO PHQ QUESTIONS 1-9: 0

## 2017-10-26 ASSESSMENT — ENCOUNTER SYMPTOMS
SHORTNESS OF BREATH: 0
NAUSEA: 0
EYE PAIN: 0
RHINORRHEA: 0
VOMITING: 0
ABDOMINAL PAIN: 1
DIARRHEA: 0
SORE THROAT: 0
CONSTIPATION: 0
COUGH: 0
WHEEZING: 0
BLOOD IN STOOL: 0
BACK PAIN: 0
CHEST TIGHTNESS: 0

## 2017-10-26 NOTE — PATIENT INSTRUCTIONS
Patient Education        Abdominal Pain: Care Instructions  Your Care Instructions    Abdominal pain has many possible causes. Some aren't serious and get better on their own in a few days. Others need more testing and treatment. If your pain continues or gets worse, you need to be rechecked and may need more tests to find out what is wrong. You may need surgery to correct the problem. Don't ignore new symptoms, such as fever, nausea and vomiting, urination problems, pain that gets worse, and dizziness. These may be signs of a more serious problem. Your doctor may have recommended a follow-up visit in the next 8 to 12 hours. If you are not getting better, you may need more tests or treatment. The doctor has checked you carefully, but problems can develop later. If you notice any problems or new symptoms, get medical treatment right away. Follow-up care is a key part of your treatment and safety. Be sure to make and go to all appointments, and call your doctor if you are having problems. It's also a good idea to know your test results and keep a list of the medicines you take. How can you care for yourself at home? · Rest until you feel better. · To prevent dehydration, drink plenty of fluids, enough so that your urine is light yellow or clear like water. Choose water and other caffeine-free clear liquids until you feel better. If you have kidney, heart, or liver disease and have to limit fluids, talk with your doctor before you increase the amount of fluids you drink. · If your stomach is upset, eat mild foods, such as rice, dry toast or crackers, bananas, and applesauce. Try eating several small meals instead of two or three large ones. · Wait until 48 hours after all symptoms have gone away before you have spicy foods, alcohol, and drinks that contain caffeine. · Do not eat foods that are high in fat. · Avoid anti-inflammatory medicines such as aspirin, ibuprofen (Advil, Motrin), and naproxen (Aleve). These can cause stomach upset. Talk to your doctor if you take daily aspirin for another health problem. When should you call for help? Call 911 anytime you think you may need emergency care. For example, call if:  · You passed out (lost consciousness). · You pass maroon or very bloody stools. · You vomit blood or what looks like coffee grounds. · You have new, severe belly pain. Call your doctor now or seek immediate medical care if:  · Your pain gets worse, especially if it becomes focused in one area of your belly. · You have a new or higher fever. · Your stools are black and look like tar, or they have streaks of blood. · You have unexpected vaginal bleeding. · You have symptoms of a urinary tract infection. These may include:  ¨ Pain when you urinate. ¨ Urinating more often than usual.  ¨ Blood in your urine. · You are dizzy or lightheaded, or you feel like you may faint. Watch closely for changes in your health, and be sure to contact your doctor if:  · You are not getting better after 1 day (24 hours). Where can you learn more? Go to https://FinanceAcar.cafegive. org and sign in to your [employer] account. Enter X226 in the Integrated Corporate Health box to learn more about \"Abdominal Pain: Care Instructions. \"     If you do not have an account, please click on the \"Sign Up Now\" link. Current as of: March 20, 2017  Content Version: 11.3  © 9385-0581 [employer]. Care instructions adapted under license by [facility] ([facility]). If you have questions about a medical condition or this instruction, always ask your healthcare professional. [place] [address] any warranty or liability for your use of this information.

## 2017-10-27 ENCOUNTER — TELEPHONE (OUTPATIENT)
Dept: FAMILY MEDICINE CLINIC | Age: 22
End: 2017-10-27

## 2017-10-27 NOTE — TELEPHONE ENCOUNTER
----- Message from Samuel Ayoub MD sent at 10/27/2017 12:38 PM EDT -----  kub shows no obstruction, does show evidence of constipation. can try MOM or miralax. If further problems, would contact surgeon.

## 2017-10-27 NOTE — TELEPHONE ENCOUNTER
----- Message from Naren Celestin MD sent at 10/26/2017 10:17 PM EDT -----  Cbc improved from 1 week ago. Wbc count is back to normal.  Await xray results.

## 2017-11-01 ENCOUNTER — OFFICE VISIT (OUTPATIENT)
Dept: SURGERY | Age: 22
End: 2017-11-01

## 2017-11-01 VITALS
SYSTOLIC BLOOD PRESSURE: 122 MMHG | RESPIRATION RATE: 18 BRPM | HEIGHT: 66 IN | DIASTOLIC BLOOD PRESSURE: 64 MMHG | HEART RATE: 107 BPM | WEIGHT: 215.3 LBS | OXYGEN SATURATION: 99 % | BODY MASS INDEX: 34.6 KG/M2 | TEMPERATURE: 96.7 F

## 2017-11-01 DIAGNOSIS — Z90.49 S/P LAPAROSCOPIC APPENDECTOMY: Primary | ICD-10-CM

## 2017-11-01 PROCEDURE — 99024 POSTOP FOLLOW-UP VISIT: CPT | Performed by: NURSE PRACTITIONER

## 2017-11-01 ASSESSMENT — ENCOUNTER SYMPTOMS
WHEEZING: 0
FACIAL SWELLING: 0
VOMITING: 0
BLOOD IN STOOL: 0
DIARRHEA: 0
SINUS PRESSURE: 0
STRIDOR: 0
APNEA: 0
RECTAL PAIN: 0
EYE REDNESS: 0
TROUBLE SWALLOWING: 0
EYE DISCHARGE: 0
COUGH: 0
CHEST TIGHTNESS: 0
ABDOMINAL DISTENTION: 0
SINUS PAIN: 0
VOICE CHANGE: 0
SHORTNESS OF BREATH: 0
EYE ITCHING: 0
COLOR CHANGE: 0
BACK PAIN: 0
ANAL BLEEDING: 0
RHINORRHEA: 0
NAUSEA: 0
CHOKING: 0
PHOTOPHOBIA: 0
EYE PAIN: 0
SORE THROAT: 0

## 2017-11-01 NOTE — PROGRESS NOTES
[facility]  [facility]  [address] [address]  [address] [address]  Dept: [phone]  Dept Fax: [phone]  Loc: [phone]    Visit Date: 11/1/2017    Patti Gardner is a 23 y.o. female who presents today for:  Chief Complaint   Patient presents with    Post-Op Check     s/p Laparoscopic appendectomy-10/19/17       HPI:     HPI    Ursula Grimes is who presents today for follow-up status post laparoscopic appendectomy 2 weeks ago. Off narcotics. States she is still having sharp, intermittent pain to left incision. States pain medication does not help though so she stopped taking it. Patient was given Percocet, Valium, Toradol and Zofran as discharge. States pain is manageable without any medication. Tolerating regular diet without any nausea or vomiting. She had some constipation. Actually went to the ER for abdominal pain and KUB showed constipation. She took stool softeners with relief. Incisions are healing well without any signs of infection. No fevers or chills. Urinating without difficulties. Denies any shortness of breath or chest pain. Tolerating increased activity well.     Past Medical History:   Diagnosis Date    Asthma not on meds    Depression       Past Surgical History:   Procedure Laterality Date    KNEE SURGERY Right     LAPAROSCOPIC APPENDECTOMY N/A 10/19/2017    APPENDECTOMY LAPAROSCOPIC performed by Miguel Alonso MD at [address] [place] [address] History   Problem Relation Age of Onset    Arthritis Mother     Depression Mother     Bipolar Disorder Father     Depression Father     Schizophrenia Father     Arthritis Maternal Grandmother     Asthma Maternal Grandmother     Cancer Maternal Grandmother     Depression Maternal Grandmother     Early Death Maternal Grandmother     High Blood Pressure Maternal Grandmother     Arthritis Maternal Grandfather        Social History   Substance Use Topics    Smoking status: Current Every Day Smoker Packs/day: 0.50     Types: Cigarettes    Smokeless tobacco: Never Used    Alcohol use No      No current outpatient prescriptions on file. No current facility-administered medications for this visit. No Known Allergies    Subjective:      Review of Systems   Constitutional: Negative for activity change, appetite change, chills, diaphoresis, fatigue, fever and unexpected weight change. HENT: Negative for congestion, dental problem, drooling, ear discharge, ear pain, facial swelling, hearing loss, mouth sores, nosebleeds, postnasal drip, rhinorrhea, sinus pain, sinus pressure, sneezing, sore throat, tinnitus, trouble swallowing and voice change. Eyes: Negative for photophobia, pain, discharge, redness, itching and visual disturbance. Respiratory: Negative for apnea, cough, choking, chest tightness, shortness of breath, wheezing and stridor. Cardiovascular: Negative for chest pain, palpitations and leg swelling. Gastrointestinal: Positive for abdominal pain and constipation. Negative for abdominal distention, anal bleeding, blood in stool, diarrhea, nausea, rectal pain and vomiting. Endocrine: Negative for cold intolerance, heat intolerance, polydipsia, polyphagia and polyuria. Genitourinary: Negative for decreased urine volume, difficulty urinating, dyspareunia, dysuria, enuresis, flank pain, frequency, genital sores, hematuria, menstrual problem, pelvic pain, urgency, vaginal bleeding, vaginal discharge and vaginal pain. Musculoskeletal: Negative for arthralgias, back pain, gait problem, joint swelling, myalgias, neck pain and neck stiffness. Skin: Negative for color change, pallor, rash and wound. Allergic/Immunologic: Negative for environmental allergies, food allergies and immunocompromised state. Neurological: Negative for dizziness, tremors, seizures, syncope, facial asymmetry, speech difficulty, weakness, light-headedness, numbness and headaches.    Hematological: Negative for distress. She has no decreased breath sounds. She has no wheezes. She has no rales. She exhibits no tenderness. Abdominal: Soft. Normal appearance and bowel sounds are normal. She exhibits no distension and no mass. There is tenderness in the right lower quadrant. Musculoskeletal: Normal range of motion. She exhibits no edema or tenderness. Neurological: She is alert and oriented to person, place, and time. She has normal strength and normal reflexes. She displays no atrophy and no tremor. She exhibits normal muscle tone. Coordination and gait normal.   Skin: Skin is warm, dry and intact. No abrasion, no bruising, no burn, no ecchymosis, no laceration, no lesion and no rash noted. She is not diaphoretic. No cyanosis or erythema. No pallor. Nails show no clubbing. Psychiatric: She has a normal mood and affect. Her speech is normal and behavior is normal. Thought content normal. Cognition and memory are normal.   Vitals reviewed. Lab Results   Component Value Date    WBC 10.8 10/26/2017    HGB 13.6 10/26/2017    HCT 41.5 10/26/2017    MCV 88.3 10/26/2017     10/26/2017     Lab Results   Component Value Date     10/20/2017    K 4.0 10/20/2017     10/20/2017    CO2 20 10/20/2017    BUN 7 10/20/2017    CREATININE 0.6 10/20/2017    GLUCOSE 148 10/20/2017    GLUCOSE 93 01/20/2016    CALCIUM 8.4 10/20/2017      PROCEDURE: XR ABDOMEN STANDARD       CLINICAL INFORMATION: S/P appendectomy, Left lower quadrant pain appendectomy one week ago, left lower quadrant pain       COMPARISON: No prior study.       TECHNIQUE: Supine and upright views       FINDINGS: The bowel gas pattern is nonspecific. There is no free air and no suggestion of bowel obstruction. There is a moderate to large amount of retained stool throughout the colon suggesting constipation. There are chain sutures in the right lower    quadrant compatible with appendectomy. A tampon silhouette superimposes the lower pelvis centrally. The osseous structures appear intact.           Impression   Nonspecific abdomen.                   **This report has been created using voice recognition software.  It may contain minor errors which are inherent in voice recognition technology. **       Final report electronically signed by Dr. Douglas Hammond on 10/27/2017 10:49 AM         PATHOLOGY REPORT    Clinical Information: ABDOMINAL PAIN    FINAL DIAGNOSIS:  Appendix, resection:   Acute suppurative appendicitis and periappendicitis. Specimen:  APPENDIX    Gross Examination:  The container is labeled Nicolasa Zamora, appendix.   Received in  formalin is a vermiform appendix measuring 6 cm in length x 0.6 cm in  diameter.  The surface is red-tan. Thang Trent is focal fibrinous exudate. Sections through the appendix reveal a patent lumen.  There are no  masses, perforations or fecaliths.  Representative sections including  the proximal resection line which is designated by a vertical knife  leon are submitted.  1 ss.   ALP/DKR:lgl    Microscopic Examination:  Sections demonstrate acute suppurative appendicitis and  periappendicitis.  No rupture is identified microscopically.  There is  no evidence of malignancy. Patient Active Problem List   Diagnosis    Depression    Bipolar 1 disorder, depressed, moderate (HCC)    Panic disorder without agoraphobia with mild panic attacks    PTSD (post-traumatic stress disorder)    Acute appendicitis    S/P laparoscopic appendectomy     Assessment:      1. Post op check status post laparoscopic appendectomy   2. Constipation  3. Postoperative pain    Plan:     1. Abdomen benign. Appetite returning. 2. All incisions healing. No redness, swelling, or drainage. Continue wound care as directed. 3. Weaned off narcotics. Continue tylenol and ibuprofen as needed. 4. Continue lifting restrictions for the full 3-4 weeks after surgery. Continue to increase activity as tolerated. Okay to return to work on Monday.     5.

## 2017-11-02 ASSESSMENT — ENCOUNTER SYMPTOMS
ABDOMINAL PAIN: 1
CONSTIPATION: 1

## 2018-01-18 ASSESSMENT — ENCOUNTER SYMPTOMS
ABDOMINAL PAIN: 1
DIARRHEA: 0
CONSTIPATION: 0
NAUSEA: 0
VOMITING: 1
BLOOD IN STOOL: 0

## 2019-03-21 ENCOUNTER — OFFICE VISIT (OUTPATIENT)
Dept: FAMILY MEDICINE CLINIC | Age: 24
End: 2019-03-21
Payer: COMMERCIAL

## 2019-03-21 VITALS
HEIGHT: 66 IN | BODY MASS INDEX: 35.32 KG/M2 | HEART RATE: 88 BPM | RESPIRATION RATE: 16 BRPM | DIASTOLIC BLOOD PRESSURE: 72 MMHG | SYSTOLIC BLOOD PRESSURE: 120 MMHG | TEMPERATURE: 97.6 F | WEIGHT: 219.8 LBS

## 2019-03-21 DIAGNOSIS — H66.002 ACUTE SUPPURATIVE OTITIS MEDIA OF LEFT EAR WITHOUT SPONTANEOUS RUPTURE OF TYMPANIC MEMBRANE, RECURRENCE NOT SPECIFIED: Primary | ICD-10-CM

## 2019-03-21 PROCEDURE — 99213 OFFICE O/P EST LOW 20 MIN: CPT | Performed by: FAMILY MEDICINE

## 2019-03-21 RX ORDER — AMOXICILLIN 500 MG/1
CAPSULE ORAL
COMMUNITY
Start: 2019-03-18 | End: 2019-08-28

## 2019-03-21 RX ORDER — AZITHROMYCIN 250 MG/1
TABLET, FILM COATED ORAL
Qty: 1 PACKET | Refills: 0 | Status: SHIPPED | OUTPATIENT
Start: 2019-03-21 | End: 2019-03-31

## 2019-03-21 ASSESSMENT — ENCOUNTER SYMPTOMS
WHEEZING: 0
COUGH: 1
EYE PAIN: 0
BACK PAIN: 0
NAUSEA: 0
SHORTNESS OF BREATH: 0
CHEST TIGHTNESS: 0
RHINORRHEA: 1
ABDOMINAL PAIN: 0
DIARRHEA: 0
BLOOD IN STOOL: 0
CONSTIPATION: 0
SORE THROAT: 1
VOMITING: 0

## 2019-04-04 ENCOUNTER — NURSE ONLY (OUTPATIENT)
Dept: LAB | Age: 24
End: 2019-04-04

## 2019-04-04 LAB
ABO: NORMAL
ANTIBODY SCREEN: NORMAL
ERYTHROCYTE [DISTWIDTH] IN BLOOD BY AUTOMATED COUNT: 13.5 % (ref 11.5–14.5)
ERYTHROCYTE [DISTWIDTH] IN BLOOD BY AUTOMATED COUNT: 44.4 FL (ref 35–45)
HCT VFR BLD CALC: 38.5 % (ref 37–47)
HEMOGLOBIN: 12.8 GM/DL (ref 12–16)
HEPATITIS B SURFACE ANTIGEN: NEGATIVE
MCH RBC QN AUTO: 29.8 PG (ref 26–33)
MCHC RBC AUTO-ENTMCNC: 33.2 GM/DL (ref 32.2–35.5)
MCV RBC AUTO: 89.7 FL (ref 81–99)
PLATELET # BLD: 212 THOU/MM3 (ref 130–400)
PMV BLD AUTO: 12.6 FL (ref 9.4–12.4)
RBC # BLD: 4.29 MILL/MM3 (ref 4.2–5.4)
RH FACTOR: NORMAL
RUBELLA: 190.7 IU/ML
TSH SERPL DL<=0.05 MIU/L-ACNC: 0.71 UIU/ML (ref 0.4–4.2)
WBC # BLD: 10 THOU/MM3 (ref 4.8–10.8)

## 2019-04-05 LAB — RPR: NONREACTIVE

## 2019-04-06 LAB — URINE CULTURE, ROUTINE: NORMAL

## 2019-04-07 LAB — HIV-2 AB: NEGATIVE

## 2019-08-28 ENCOUNTER — HOSPITAL ENCOUNTER (EMERGENCY)
Age: 24
Discharge: HOME | End: 2019-08-28
Payer: COMMERCIAL

## 2019-08-28 ENCOUNTER — APPOINTMENT (OUTPATIENT)
Dept: CT IMAGING | Age: 24
End: 2019-08-28
Payer: COMMERCIAL

## 2019-08-28 VITALS
RESPIRATION RATE: 16 BRPM | DIASTOLIC BLOOD PRESSURE: 62 MMHG | OXYGEN SATURATION: 97 % | TEMPERATURE: 98.7 F | HEIGHT: 67 IN | WEIGHT: 218 LBS | HEART RATE: 77 BPM | SYSTOLIC BLOOD PRESSURE: 101 MMHG | BODY MASS INDEX: 34.21 KG/M2
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
## 2019-08-28 DIAGNOSIS — S43.401A SPRAIN OF RIGHT SHOULDER, UNSPECIFIED SHOULDER SPRAIN TYPE, INITIAL ENCOUNTER: ICD-10-CM

## 2019-08-28 DIAGNOSIS — S09.90XA CLOSED HEAD INJURY, INITIAL ENCOUNTER: Primary | ICD-10-CM

## 2019-08-28 DIAGNOSIS — Z3A.33 33 WEEKS GESTATION OF PREGNANCY: ICD-10-CM

## 2019-08-28 DIAGNOSIS — S80.00XA CONTUSION OF KNEE, UNSPECIFIED LATERALITY, INITIAL ENCOUNTER: ICD-10-CM

## 2019-08-28 PROCEDURE — 99284 EMERGENCY DEPT VISIT MOD MDM: CPT

## 2019-08-28 PROCEDURE — 96375 TX/PRO/DX INJ NEW DRUG ADDON: CPT

## 2019-08-28 PROCEDURE — 59025 FETAL NON-STRESS TEST: CPT

## 2019-08-28 PROCEDURE — 70486 CT MAXILLOFACIAL W/O DYE: CPT

## 2019-08-28 PROCEDURE — 2709999900 HC NON-CHARGEABLE SUPPLY

## 2019-08-28 PROCEDURE — 96374 THER/PROPH/DIAG INJ IV PUSH: CPT

## 2019-08-28 PROCEDURE — 70450 CT HEAD/BRAIN W/O DYE: CPT

## 2019-08-28 PROCEDURE — 2580000003 HC RX 258: Performed by: PHYSICIAN ASSISTANT

## 2019-08-28 PROCEDURE — 6360000002 HC RX W HCPCS: Performed by: PHYSICIAN ASSISTANT

## 2019-08-28 RX ORDER — METOCLOPRAMIDE HYDROCHLORIDE 5 MG/ML
10 INJECTION INTRAMUSCULAR; INTRAVENOUS ONCE
Status: COMPLETED | OUTPATIENT
Start: 2019-08-28 | End: 2019-08-28

## 2019-08-28 RX ORDER — 0.9 % SODIUM CHLORIDE 0.9 %
1000 INTRAVENOUS SOLUTION INTRAVENOUS ONCE
Status: COMPLETED | OUTPATIENT
Start: 2019-08-28 | End: 2019-08-28

## 2019-08-28 RX ORDER — DIPHENHYDRAMINE HYDROCHLORIDE 50 MG/ML
25 INJECTION INTRAMUSCULAR; INTRAVENOUS ONCE
Status: COMPLETED | OUTPATIENT
Start: 2019-08-28 | End: 2019-08-28

## 2019-08-28 RX ADMIN — DIPHENHYDRAMINE HYDROCHLORIDE 25 MG: 50 INJECTION, SOLUTION INTRAMUSCULAR; INTRAVENOUS at 10:25

## 2019-08-28 RX ADMIN — METOCLOPRAMIDE 10 MG: 5 INJECTION, SOLUTION INTRAMUSCULAR; INTRAVENOUS at 10:25

## 2019-08-28 RX ADMIN — SODIUM CHLORIDE 1000 ML: 9 INJECTION, SOLUTION INTRAVENOUS at 10:25

## 2019-08-28 ASSESSMENT — ENCOUNTER SYMPTOMS
VOMITING: 1
CONSTIPATION: 0
SHORTNESS OF BREATH: 0
RHINORRHEA: 0
SORE THROAT: 0
DIARRHEA: 0
PHOTOPHOBIA: 0
BACK PAIN: 0
NAUSEA: 1
ABDOMINAL PAIN: 1
FACIAL SWELLING: 1
BLOOD IN STOOL: 0
COUGH: 0
WHEEZING: 0
COLOR CHANGE: 1

## 2019-08-28 ASSESSMENT — PAIN DESCRIPTION - PAIN TYPE
TYPE: ACUTE PAIN
TYPE_2: ACUTE PAIN
TYPE_2: ACUTE PAIN
TYPE: ACUTE PAIN

## 2019-08-28 ASSESSMENT — PAIN DESCRIPTION - PROGRESSION
CLINICAL_PROGRESSION_2: GRADUALLY IMPROVING
CLINICAL_PROGRESSION: GRADUALLY IMPROVING

## 2019-08-28 ASSESSMENT — PAIN DESCRIPTION - LOCATION
LOCATION_2: FACE
LOCATION_2: FACE
LOCATION: SHOULDER
LOCATION: SHOULDER

## 2019-08-28 ASSESSMENT — PAIN DESCRIPTION - ONSET
ONSET: SUDDEN
ONSET_2: SUDDEN

## 2019-08-28 ASSESSMENT — PAIN SCALES - GENERAL
PAINLEVEL_OUTOF10: 2
PAINLEVEL_OUTOF10: 6

## 2019-08-28 ASSESSMENT — PAIN DESCRIPTION - ORIENTATION
ORIENTATION_2: RIGHT
ORIENTATION_2: RIGHT
ORIENTATION: RIGHT
ORIENTATION: RIGHT

## 2019-08-28 ASSESSMENT — PAIN DESCRIPTION - DESCRIPTORS
DESCRIPTORS_2: DULL;HEADACHE;ACHING
DESCRIPTORS: SORE
DESCRIPTORS: SORE
DESCRIPTORS_2: HEADACHE;ACHING;THROBBING

## 2019-08-28 ASSESSMENT — PAIN DESCRIPTION - INTENSITY
RATING_2: 2
RATING_2: 8

## 2019-08-28 NOTE — ED PROVIDER NOTES
patient headaches with persistent vomiting following her fall, and concerns for possible facial bone fracture secondary to the tenderness and bruising around the right eye. Patient was amenable with workup plans. I spoke with Dr. Teri Sherwood (On call OB) who reported the was no need for a formal OB US in the ED, the OB monitoring shoulder be sufficient and was okay with plan for having head and facial bone CTs completed. Fetal monitoring was initiated via L&D nurse who came to the ED. Patient was treated with IV fluids, Reglan, and Benadryl while in the ED for her nausea and headache. She did report improvement in her symptoms upon reassessment. Head and facial bone CTs were completed and showed no acute findings. Patient's OB, Dr. Carlito Koroma, had contacted the patient's nurse in the ED and reported that the patient did not need to continue having the fetal monitoring for the additional 4 hours and was okay to be discharged home from the ED. Patient was informed of workup results and plan for her to be discharged home with outpatient OB follow up. Patient was discharged home in stable condition. She was instructed to follow up with her OB at her scheduled appointment and to follow up with Dr. Wojciech Art (Sports Medicine) if her headaches persistent or worsen. The patient was amenable with all discharge and follow up instructions. All questions were addressed and answered. Return precautions were given for new or worsening symptoms. CRITICAL CARE:   None      CONSULTS:  OB/GYN    PROCEDURES:  None     FINAL IMPRESSION     1. Closed head injury, initial encounter    2. Sprain of right shoulder, unspecified shoulder sprain type, initial encounter    3. Contusion of knee, unspecified laterality, initial encounter    4. 33 weeks gestation of pregnancy    5. Fall from slip, trip, or stumble, initial encounter          DISPOSITION/PLAN   Discharged     PATIENT REFERRED TO:  Luz Maria Chong MD  [address] [facility]

## 2019-08-28 NOTE — FLOWSHEET NOTE
Dr. Sima Mcgregor returns page, informed of her pt's admission in the ED for c/o falling and hitting her head on the doorframe. MD aware of pt's history at 33wks. FHR reactive, vitals WNL, no ctx. Pt had the injury last night around 9:45pm.  Came in for HA. Has been monitored for roughly an hour. ORders received to remove fetal monitoring and cleared Obstetrically. ED called and informed Rober Fuentes of orders. Will be down to remove fetal monitors.

## 2019-08-29 ENCOUNTER — TELEPHONE (OUTPATIENT)
Dept: FAMILY MEDICINE CLINIC | Age: 24
End: 2019-08-29

## 2019-08-29 NOTE — TELEPHONE ENCOUNTER
[facility] ([facility]) ED Follow up Call    Reason for ED visit:  Fall     Mychart message sent

## 2019-09-17 ENCOUNTER — NURSE ONLY (OUTPATIENT)
Dept: LAB | Age: 24
End: 2019-09-17

## 2019-10-15 ENCOUNTER — ANESTHESIA (OUTPATIENT)
Dept: LABOR AND DELIVERY | Age: 24
End: 2019-10-15
Payer: COMMERCIAL

## 2019-10-15 ENCOUNTER — ANESTHESIA EVENT (OUTPATIENT)
Dept: LABOR AND DELIVERY | Age: 24
End: 2019-10-15
Payer: COMMERCIAL

## 2019-10-15 ENCOUNTER — APPOINTMENT (OUTPATIENT)
Dept: LABOR AND DELIVERY | Age: 24
End: 2019-10-15
Payer: COMMERCIAL

## 2019-10-15 ENCOUNTER — HOSPITAL ENCOUNTER (INPATIENT)
Age: 24
LOS: 2 days | Discharge: HOME | End: 2019-10-17
Attending: OBSTETRICS & GYNECOLOGY | Admitting: OBSTETRICS & GYNECOLOGY
Payer: COMMERCIAL

## 2019-10-15 LAB
ABO: NORMAL
AMPHETAMINE+METHAMPHETAMINE URINE SCREEN: NEGATIVE
ANTIBODY SCREEN: NORMAL
BARBITURATE QUANTITATIVE URINE: NEGATIVE
BASOPHILS # BLD: 0.3 %
BASOPHILS ABSOLUTE: 0.1 THOU/MM3 (ref 0–0.1)
BENZODIAZEPINE QUANTITATIVE URINE: NEGATIVE
CANNABINOID QUANTITATIVE URINE: NEGATIVE
COCAINE METABOLITE QUANTITATIVE URINE: NEGATIVE
EOSINOPHIL # BLD: 0.6 %
EOSINOPHILS ABSOLUTE: 0.1 THOU/MM3 (ref 0–0.4)
ERYTHROCYTE [DISTWIDTH] IN BLOOD BY AUTOMATED COUNT: 13.8 % (ref 11.5–14.5)
ERYTHROCYTE [DISTWIDTH] IN BLOOD BY AUTOMATED COUNT: 45.9 FL (ref 35–45)
HCT VFR BLD CALC: 37.4 % (ref 37–47)
HEMOGLOBIN: 12.9 GM/DL (ref 12–16)
IMMATURE GRANS (ABS): 0.18 THOU/MM3 (ref 0–0.07)
IMMATURE GRANULOCYTES: 0.9 %
LYMPHOCYTES # BLD: 16.4 %
LYMPHOCYTES ABSOLUTE: 3.2 THOU/MM3 (ref 1–4.8)
MCH RBC QN AUTO: 31.4 PG (ref 26–33)
MCHC RBC AUTO-ENTMCNC: 34.5 GM/DL (ref 32.2–35.5)
MCV RBC AUTO: 91 FL (ref 81–99)
MONOCYTES # BLD: 5.7 %
MONOCYTES ABSOLUTE: 1.1 THOU/MM3 (ref 0.4–1.3)
NUCLEATED RED BLOOD CELLS: 0 /100 WBC
OPIATES, URINE: NEGATIVE
OXYCODONE: NEGATIVE
PHENCYCLIDINE QUANTITATIVE URINE: NEGATIVE
PLATELET # BLD: 163 THOU/MM3 (ref 130–400)
PMV BLD AUTO: 12.6 FL (ref 9.4–12.4)
RBC # BLD: 4.11 MILL/MM3 (ref 4.2–5.4)
RH FACTOR: NORMAL
RPR: NONREACTIVE
SEG NEUTROPHILS: 76.1 %
SEGMENTED NEUTROPHILS ABSOLUTE COUNT: 15.1 THOU/MM3 (ref 1.8–7.7)
WBC # BLD: 19.8 THOU/MM3 (ref 4.8–10.8)

## 2019-10-15 PROCEDURE — 6360000002 HC RX W HCPCS: Performed by: OBSTETRICS & GYNECOLOGY

## 2019-10-15 PROCEDURE — 86900 BLOOD TYPING SEROLOGIC ABO: CPT

## 2019-10-15 PROCEDURE — 86901 BLOOD TYPING SEROLOGIC RH(D): CPT

## 2019-10-15 PROCEDURE — 0KQM0ZZ REPAIR PERINEUM MUSCLE, OPEN APPROACH: ICD-10-PCS | Performed by: OBSTETRICS & GYNECOLOGY

## 2019-10-15 PROCEDURE — 3E033VJ INTRODUCTION OF OTHER HORMONE INTO PERIPHERAL VEIN, PERCUTANEOUS APPROACH: ICD-10-PCS | Performed by: OBSTETRICS & GYNECOLOGY

## 2019-10-15 PROCEDURE — 86850 RBC ANTIBODY SCREEN: CPT

## 2019-10-15 PROCEDURE — 2580000003 HC RX 258: Performed by: OBSTETRICS & GYNECOLOGY

## 2019-10-15 PROCEDURE — 6370000000 HC RX 637 (ALT 250 FOR IP): Performed by: OBSTETRICS & GYNECOLOGY

## 2019-10-15 PROCEDURE — 86592 SYPHILIS TEST NON-TREP QUAL: CPT

## 2019-10-15 PROCEDURE — 2580000003 HC RX 258

## 2019-10-15 PROCEDURE — 7200000001 HC VAGINAL DELIVERY

## 2019-10-15 PROCEDURE — 85025 COMPLETE CBC W/AUTO DIFF WBC: CPT

## 2019-10-15 PROCEDURE — 80307 DRUG TEST PRSMV CHEM ANLYZR: CPT

## 2019-10-15 PROCEDURE — 2709999900 HC NON-CHARGEABLE SUPPLY

## 2019-10-15 PROCEDURE — 6360000002 HC RX W HCPCS

## 2019-10-15 PROCEDURE — 36415 COLL VENOUS BLD VENIPUNCTURE: CPT

## 2019-10-15 PROCEDURE — 2500000003 HC RX 250 WO HCPCS: Performed by: ANESTHESIOLOGY

## 2019-10-15 PROCEDURE — 1200000000 HC SEMI PRIVATE

## 2019-10-15 PROCEDURE — 3700000025 EPIDURAL BLOCK: Performed by: ANESTHESIOLOGY

## 2019-10-15 PROCEDURE — 10907ZC DRAINAGE OF AMNIOTIC FLUID, THERAPEUTIC FROM PRODUCTS OF CONCEPTION, VIA NATURAL OR ARTIFICIAL OPENING: ICD-10-PCS | Performed by: OBSTETRICS & GYNECOLOGY

## 2019-10-15 RX ORDER — DIPHENHYDRAMINE HYDROCHLORIDE 50 MG/ML
25 INJECTION INTRAMUSCULAR; INTRAVENOUS EVERY 6 HOURS PRN
Status: DISCONTINUED | OUTPATIENT
Start: 2019-10-15 | End: 2019-10-15

## 2019-10-15 RX ORDER — METHYLERGONOVINE MALEATE 0.2 MG/ML
200 INJECTION INTRAVENOUS PRN
Status: DISCONTINUED | OUTPATIENT
Start: 2019-10-15 | End: 2019-10-15 | Stop reason: HOSPADM

## 2019-10-15 RX ORDER — ZOLPIDEM TARTRATE 10 MG/1
10 TABLET ORAL NIGHTLY PRN
Status: DISCONTINUED | OUTPATIENT
Start: 2019-10-15 | End: 2019-10-17 | Stop reason: HOSPADM

## 2019-10-15 RX ORDER — SODIUM CHLORIDE, SODIUM LACTATE, POTASSIUM CHLORIDE, CALCIUM CHLORIDE 600; 310; 30; 20 MG/100ML; MG/100ML; MG/100ML; MG/100ML
INJECTION, SOLUTION INTRAVENOUS CONTINUOUS
Status: DISCONTINUED | OUTPATIENT
Start: 2019-10-15 | End: 2019-10-15

## 2019-10-15 RX ORDER — ACETAMINOPHEN 325 MG/1
650 TABLET ORAL EVERY 4 HOURS PRN
Status: DISCONTINUED | OUTPATIENT
Start: 2019-10-15 | End: 2019-10-15 | Stop reason: HOSPADM

## 2019-10-15 RX ORDER — SODIUM CHLORIDE 0.9 % (FLUSH) 0.9 %
10 SYRINGE (ML) INJECTION EVERY 12 HOURS SCHEDULED
Status: DISCONTINUED | OUTPATIENT
Start: 2019-10-15 | End: 2019-10-15

## 2019-10-15 RX ORDER — METHYLERGONOVINE MALEATE 0.2 MG/ML
200 INJECTION INTRAVENOUS
Status: ACTIVE | OUTPATIENT
Start: 2019-10-15 | End: 2019-10-15

## 2019-10-15 RX ORDER — NALOXONE HYDROCHLORIDE 0.4 MG/ML
0.4 INJECTION, SOLUTION INTRAMUSCULAR; INTRAVENOUS; SUBCUTANEOUS PRN
Status: DISCONTINUED | OUTPATIENT
Start: 2019-10-15 | End: 2019-10-15

## 2019-10-15 RX ORDER — MORPHINE SULFATE 4 MG/ML
4 INJECTION, SOLUTION INTRAMUSCULAR; INTRAVENOUS
Status: DISCONTINUED | OUTPATIENT
Start: 2019-10-15 | End: 2019-10-17 | Stop reason: HOSPADM

## 2019-10-15 RX ORDER — SODIUM CHLORIDE 0.9 % (FLUSH) 0.9 %
10 SYRINGE (ML) INJECTION PRN
Status: DISCONTINUED | OUTPATIENT
Start: 2019-10-15 | End: 2019-10-15 | Stop reason: HOSPADM

## 2019-10-15 RX ORDER — CARBOPROST TROMETHAMINE 250 UG/ML
250 INJECTION, SOLUTION INTRAMUSCULAR PRN
Status: CANCELLED | OUTPATIENT
Start: 2019-10-15

## 2019-10-15 RX ORDER — CARBOPROST TROMETHAMINE 250 UG/ML
250 INJECTION, SOLUTION INTRAMUSCULAR
Status: DISPENSED | OUTPATIENT
Start: 2019-10-15 | End: 2019-10-15

## 2019-10-15 RX ORDER — MORPHINE SULFATE 2 MG/ML
2 INJECTION, SOLUTION INTRAMUSCULAR; INTRAVENOUS
Status: DISCONTINUED | OUTPATIENT
Start: 2019-10-15 | End: 2019-10-17 | Stop reason: HOSPADM

## 2019-10-15 RX ORDER — HYDROCODONE BITARTRATE AND ACETAMINOPHEN 5; 325 MG/1; MG/1
2 TABLET ORAL EVERY 4 HOURS PRN
Status: DISCONTINUED | OUTPATIENT
Start: 2019-10-15 | End: 2019-10-17 | Stop reason: HOSPADM

## 2019-10-15 RX ORDER — ONDANSETRON 2 MG/ML
8 INJECTION INTRAMUSCULAR; INTRAVENOUS EVERY 6 HOURS PRN
Status: DISCONTINUED | OUTPATIENT
Start: 2019-10-15 | End: 2019-10-15 | Stop reason: HOSPADM

## 2019-10-15 RX ORDER — TERBUTALINE SULFATE 1 MG/ML
0.25 INJECTION, SOLUTION SUBCUTANEOUS
Status: DISCONTINUED | OUTPATIENT
Start: 2019-10-15 | End: 2019-10-15 | Stop reason: HOSPADM

## 2019-10-15 RX ORDER — ACETAMINOPHEN 325 MG/1
650 TABLET ORAL EVERY 4 HOURS PRN
Status: DISCONTINUED | OUTPATIENT
Start: 2019-10-15 | End: 2019-10-17 | Stop reason: HOSPADM

## 2019-10-15 RX ORDER — ONDANSETRON 2 MG/ML
4 INJECTION INTRAMUSCULAR; INTRAVENOUS EVERY 6 HOURS PRN
Status: DISCONTINUED | OUTPATIENT
Start: 2019-10-15 | End: 2019-10-15

## 2019-10-15 RX ORDER — LIDOCAINE HYDROCHLORIDE 10 MG/ML
30 INJECTION, SOLUTION EPIDURAL; INFILTRATION; INTRACAUDAL; PERINEURAL PRN
Status: DISCONTINUED | OUTPATIENT
Start: 2019-10-15 | End: 2019-10-15 | Stop reason: HOSPADM

## 2019-10-15 RX ORDER — DIPHENHYDRAMINE HCL 25 MG
25 TABLET ORAL EVERY 6 HOURS PRN
Status: DISCONTINUED | OUTPATIENT
Start: 2019-10-15 | End: 2019-10-17 | Stop reason: HOSPADM

## 2019-10-15 RX ORDER — IBUPROFEN 800 MG/1
800 TABLET ORAL 3 TIMES DAILY
Status: DISCONTINUED | OUTPATIENT
Start: 2019-10-15 | End: 2019-10-17 | Stop reason: HOSPADM

## 2019-10-15 RX ORDER — ROPIVACAINE HYDROCHLORIDE 2 MG/ML
INJECTION, SOLUTION EPIDURAL; INFILTRATION; PERINEURAL
Status: DISCONTINUED
Start: 2019-10-15 | End: 2019-10-15

## 2019-10-15 RX ORDER — ONDANSETRON 4 MG/1
8 TABLET, FILM COATED ORAL EVERY 8 HOURS PRN
Status: DISCONTINUED | OUTPATIENT
Start: 2019-10-15 | End: 2019-10-17 | Stop reason: HOSPADM

## 2019-10-15 RX ORDER — SODIUM CHLORIDE 0.9 % (FLUSH) 0.9 %
10 SYRINGE (ML) INJECTION EVERY 12 HOURS SCHEDULED
Status: DISCONTINUED | OUTPATIENT
Start: 2019-10-15 | End: 2019-10-17 | Stop reason: HOSPADM

## 2019-10-15 RX ORDER — SEVOFLURANE 250 ML/250ML
1 LIQUID RESPIRATORY (INHALATION) CONTINUOUS PRN
Status: DISCONTINUED | OUTPATIENT
Start: 2019-10-15 | End: 2019-10-15 | Stop reason: HOSPADM

## 2019-10-15 RX ORDER — MORPHINE SULFATE 2 MG/ML
2 INJECTION, SOLUTION INTRAMUSCULAR; INTRAVENOUS
Status: DISCONTINUED | OUTPATIENT
Start: 2019-10-15 | End: 2019-10-15 | Stop reason: HOSPADM

## 2019-10-15 RX ORDER — IBUPROFEN 800 MG/1
800 TABLET ORAL EVERY 8 HOURS PRN
Status: DISCONTINUED | OUTPATIENT
Start: 2019-10-15 | End: 2019-10-15 | Stop reason: HOSPADM

## 2019-10-15 RX ORDER — LANOLIN 100 %
OINTMENT (GRAM) TOPICAL PRN
Status: DISCONTINUED | OUTPATIENT
Start: 2019-10-15 | End: 2019-10-17 | Stop reason: HOSPADM

## 2019-10-15 RX ORDER — SODIUM CHLORIDE 0.9 % (FLUSH) 0.9 %
10 SYRINGE (ML) INJECTION PRN
Status: DISCONTINUED | OUTPATIENT
Start: 2019-10-15 | End: 2019-10-17 | Stop reason: HOSPADM

## 2019-10-15 RX ORDER — MORPHINE SULFATE 4 MG/ML
4 INJECTION, SOLUTION INTRAMUSCULAR; INTRAVENOUS
Status: DISCONTINUED | OUTPATIENT
Start: 2019-10-15 | End: 2019-10-15 | Stop reason: HOSPADM

## 2019-10-15 RX ORDER — FERROUS SULFATE 325(65) MG
325 TABLET ORAL
Status: DISCONTINUED | OUTPATIENT
Start: 2019-10-16 | End: 2019-10-17 | Stop reason: HOSPADM

## 2019-10-15 RX ORDER — SODIUM CHLORIDE, SODIUM LACTATE, POTASSIUM CHLORIDE, CALCIUM CHLORIDE 600; 310; 30; 20 MG/100ML; MG/100ML; MG/100ML; MG/100ML
INJECTION, SOLUTION INTRAVENOUS CONTINUOUS
Status: DISCONTINUED | OUTPATIENT
Start: 2019-10-15 | End: 2019-10-17 | Stop reason: HOSPADM

## 2019-10-15 RX ORDER — ONDANSETRON 2 MG/ML
4 INJECTION INTRAMUSCULAR; INTRAVENOUS EVERY 6 HOURS PRN
Status: DISCONTINUED | OUTPATIENT
Start: 2019-10-15 | End: 2019-10-17 | Stop reason: HOSPADM

## 2019-10-15 RX ORDER — DOCUSATE SODIUM 100 MG/1
100 CAPSULE, LIQUID FILLED ORAL 2 TIMES DAILY PRN
Status: DISCONTINUED | OUTPATIENT
Start: 2019-10-15 | End: 2019-10-17 | Stop reason: HOSPADM

## 2019-10-15 RX ORDER — MISOPROSTOL 200 UG/1
1000 TABLET ORAL PRN
Status: DISCONTINUED | OUTPATIENT
Start: 2019-10-15 | End: 2019-10-15 | Stop reason: HOSPADM

## 2019-10-15 RX ORDER — DIPHENHYDRAMINE HYDROCHLORIDE 50 MG/ML
25 INJECTION INTRAMUSCULAR; INTRAVENOUS EVERY 4 HOURS PRN
Status: DISCONTINUED | OUTPATIENT
Start: 2019-10-15 | End: 2019-10-15 | Stop reason: HOSPADM

## 2019-10-15 RX ORDER — HYDROCODONE BITARTRATE AND ACETAMINOPHEN 5; 325 MG/1; MG/1
1 TABLET ORAL EVERY 4 HOURS PRN
Status: DISCONTINUED | OUTPATIENT
Start: 2019-10-15 | End: 2019-10-17 | Stop reason: HOSPADM

## 2019-10-15 RX ORDER — BUTORPHANOL TARTRATE 1 MG/ML
1 INJECTION, SOLUTION INTRAMUSCULAR; INTRAVENOUS
Status: DISCONTINUED | OUTPATIENT
Start: 2019-10-15 | End: 2019-10-15 | Stop reason: HOSPADM

## 2019-10-15 RX ORDER — PENICILLIN G 3000000 [IU]/50ML
3 INJECTION, SOLUTION INTRAVENOUS EVERY 4 HOURS
Status: DISCONTINUED | OUTPATIENT
Start: 2019-10-15 | End: 2019-10-15

## 2019-10-15 RX ORDER — MISOPROSTOL 200 UG/1
800 TABLET ORAL
Status: ACTIVE | OUTPATIENT
Start: 2019-10-15 | End: 2019-10-15

## 2019-10-15 RX ADMIN — IBUPROFEN 800 MG: 800 TABLET ORAL at 13:08

## 2019-10-15 RX ADMIN — SODIUM CHLORIDE, POTASSIUM CHLORIDE, SODIUM LACTATE AND CALCIUM CHLORIDE: 600; 310; 30; 20 INJECTION, SOLUTION INTRAVENOUS at 08:32

## 2019-10-15 RX ADMIN — DEXTROSE MONOHYDRATE 5 MILLION UNITS: 5 INJECTION INTRAVENOUS at 06:25

## 2019-10-15 RX ADMIN — Medication 1 MILLI-UNITS/MIN: at 06:19

## 2019-10-15 RX ADMIN — Medication 16 ML/HR: at 10:36

## 2019-10-15 RX ADMIN — SODIUM CHLORIDE, POTASSIUM CHLORIDE, SODIUM LACTATE AND CALCIUM CHLORIDE: 600; 310; 30; 20 INJECTION, SOLUTION INTRAVENOUS at 12:19

## 2019-10-15 RX ADMIN — BENZOCAINE AND LEVOMENTHOL: 200; 5 SPRAY TOPICAL at 13:08

## 2019-10-15 RX ADMIN — PENICILLIN G 3 MILLION UNITS: 3000000 INJECTION, SOLUTION INTRAVENOUS at 10:12

## 2019-10-15 RX ADMIN — SODIUM CHLORIDE, POTASSIUM CHLORIDE, SODIUM LACTATE AND CALCIUM CHLORIDE: 600; 310; 30; 20 INJECTION, SOLUTION INTRAVENOUS at 05:55

## 2019-10-15 ASSESSMENT — PAIN DESCRIPTION - DESCRIPTORS: DESCRIPTORS: CRAMPING

## 2019-10-15 ASSESSMENT — PAIN DESCRIPTION - FREQUENCY: FREQUENCY: INTERMITTENT

## 2019-10-15 ASSESSMENT — PAIN DESCRIPTION - ONSET: ONSET: GRADUAL

## 2019-10-15 ASSESSMENT — PAIN SCALES - GENERAL
PAINLEVEL_OUTOF10: 6
PAINLEVEL_OUTOF10: 4
PAINLEVEL_OUTOF10: 5
PAINLEVEL_OUTOF10: 2

## 2019-10-15 ASSESSMENT — PAIN DESCRIPTION - PAIN TYPE: TYPE: ACUTE PAIN

## 2019-10-15 ASSESSMENT — PAIN DESCRIPTION - PROGRESSION: CLINICAL_PROGRESSION: GRADUALLY WORSENING

## 2019-10-15 ASSESSMENT — PAIN DESCRIPTION - LOCATION: LOCATION: ABDOMEN;BACK

## 2019-10-15 ASSESSMENT — PAIN DESCRIPTION - ORIENTATION: ORIENTATION: MID;LOWER

## 2019-10-16 PROCEDURE — 1200000000 HC SEMI PRIVATE

## 2019-10-16 PROCEDURE — 2709999900 HC NON-CHARGEABLE SUPPLY

## 2019-10-16 PROCEDURE — 6370000000 HC RX 637 (ALT 250 FOR IP): Performed by: OBSTETRICS & GYNECOLOGY

## 2019-10-16 RX ADMIN — IBUPROFEN 800 MG: 800 TABLET, FILM COATED ORAL at 00:03

## 2019-10-16 RX ADMIN — IBUPROFEN 800 MG: 800 TABLET, FILM COATED ORAL at 18:45

## 2019-10-16 RX ADMIN — ACETAMINOPHEN 650 MG: 325 TABLET ORAL at 04:12

## 2019-10-16 RX ADMIN — IBUPROFEN 800 MG: 800 TABLET, FILM COATED ORAL at 10:22

## 2019-10-16 ASSESSMENT — PAIN SCALES - GENERAL
PAINLEVEL_OUTOF10: 6
PAINLEVEL_OUTOF10: 4
PAINLEVEL_OUTOF10: 0
PAINLEVEL_OUTOF10: 6
PAINLEVEL_OUTOF10: 4

## 2019-10-17 VITALS
HEART RATE: 68 BPM | DIASTOLIC BLOOD PRESSURE: 62 MMHG | WEIGHT: 217 LBS | HEIGHT: 67 IN | SYSTOLIC BLOOD PRESSURE: 110 MMHG | BODY MASS INDEX: 34.06 KG/M2 | OXYGEN SATURATION: 96 % | RESPIRATION RATE: 16 BRPM | TEMPERATURE: 97.9 F

## 2019-10-17 PROCEDURE — 6370000000 HC RX 637 (ALT 250 FOR IP): Performed by: OBSTETRICS & GYNECOLOGY

## 2019-10-17 PROCEDURE — 2709999900 HC NON-CHARGEABLE SUPPLY

## 2019-10-17 RX ADMIN — BENZOCAINE AND LEVOMENTHOL: 200; 5 SPRAY TOPICAL at 14:55

## 2019-10-17 ASSESSMENT — PAIN SCALES - GENERAL: PAINLEVEL_OUTOF10: 0

## 2019-10-21 ENCOUNTER — TELEPHONE (OUTPATIENT)
Dept: FAMILY MEDICINE CLINIC | Age: 24
End: 2019-10-21

## 2021-04-29 ENCOUNTER — NURSE TRIAGE (OUTPATIENT)
Dept: OTHER | Facility: CLINIC | Age: 26
End: 2021-04-29

## 2021-04-29 ENCOUNTER — OFFICE VISIT (OUTPATIENT)
Dept: FAMILY MEDICINE CLINIC | Age: 26
End: 2021-04-29
Payer: COMMERCIAL

## 2021-04-29 VITALS
HEART RATE: 100 BPM | WEIGHT: 232 LBS | DIASTOLIC BLOOD PRESSURE: 68 MMHG | OXYGEN SATURATION: 98 % | BODY MASS INDEX: 35.16 KG/M2 | TEMPERATURE: 97.2 F | SYSTOLIC BLOOD PRESSURE: 116 MMHG | HEIGHT: 68 IN | RESPIRATION RATE: 16 BRPM

## 2021-04-29 DIAGNOSIS — L50.0 ALLERGIC URTICARIA: Primary | ICD-10-CM

## 2021-04-29 PROCEDURE — 96372 THER/PROPH/DIAG INJ SC/IM: CPT | Performed by: FAMILY MEDICINE

## 2021-04-29 RX ORDER — PREDNISONE 20 MG/1
TABLET ORAL
Qty: 15 TABLET | Refills: 0 | Status: SHIPPED | OUTPATIENT
Start: 2021-04-29

## 2021-04-29 RX ORDER — METHYLPREDNISOLONE ACETATE 80 MG/ML
80 INJECTION, SUSPENSION INTRA-ARTICULAR; INTRALESIONAL; INTRAMUSCULAR; SOFT TISSUE ONCE
Status: COMPLETED | OUTPATIENT
Start: 2021-04-29 | End: 2021-04-29

## 2021-04-29 RX ADMIN — METHYLPREDNISOLONE ACETATE 80 MG: 80 INJECTION, SUSPENSION INTRA-ARTICULAR; INTRALESIONAL; INTRAMUSCULAR; SOFT TISSUE at 09:20

## 2021-04-29 SDOH — ECONOMIC STABILITY: TRANSPORTATION INSECURITY
IN THE PAST 12 MONTHS, HAS LACK OF TRANSPORTATION KEPT YOU FROM MEETINGS, WORK, OR FROM GETTING THINGS NEEDED FOR DAILY LIVING?: NO

## 2021-04-29 SDOH — ECONOMIC STABILITY: FOOD INSECURITY: WITHIN THE PAST 12 MONTHS, YOU WORRIED THAT YOUR FOOD WOULD RUN OUT BEFORE YOU GOT MONEY TO BUY MORE.: NEVER TRUE

## 2021-04-29 SDOH — ECONOMIC STABILITY: INCOME INSECURITY: HOW HARD IS IT FOR YOU TO PAY FOR THE VERY BASICS LIKE FOOD, HOUSING, MEDICAL CARE, AND HEATING?: NOT HARD AT ALL

## 2021-04-29 ASSESSMENT — ENCOUNTER SYMPTOMS
EYE PAIN: 0
ABDOMINAL PAIN: 0
NAUSEA: 0
CHEST TIGHTNESS: 0
SHORTNESS OF BREATH: 0
SORE THROAT: 0
BLOOD IN STOOL: 0
CONSTIPATION: 0
WHEEZING: 0
RHINORRHEA: 0
DIARRHEA: 0
VOMITING: 0
COUGH: 0
BACK PAIN: 0

## 2021-04-29 NOTE — PROGRESS NOTES
Tawnya Cespedes (:  1995) is a 30 y.o. female,Established patient, here for evaluation of the following chief complaint(s):  Rash (woke up this morning)      ASSESSMENT/PLAN:  1. Allergic urticaria  -     methylPREDNISolone acetate (DEPO-MEDROL) injection 80 mg; 80 mg, Intramuscular, ONCE, Thu 21 at 0930, For 1 dose  -     hydrocortisone sodium succinate PF (SOLU-CORTEF) injection 50 mg; 50 mg, Intramuscular, ONCE, Thu 21 at 0930, For 1 dose  -     predniSONE (DELTASONE) 20 MG tablet; Take 1 tab BID x 5 days, then 1 tab daily x 5 days. Take with food. , Disp-15 tablet, R-0Normal  -new problem, unknown etiology, treat with steroids oral and injectable, call if not improving    No follow-ups on file. SUBJECTIVE/OBJECTIVE:  HPI  Pt here for a check up. Reviewed BMI of 36. Encouraged diet, exercise and weight loss. Woke up this AM with rash from neck to knees. Bright red, itchy. Denies new products, foods, no meds. Took some benadryl. No ill, no fever. , current smoker, pmh reviewed. Review of Systems   Constitutional: Negative for chills, fatigue, fever and unexpected weight change. HENT: Negative for congestion, ear pain, rhinorrhea and sore throat. Eyes: Negative for pain and visual disturbance. Respiratory: Negative for cough, chest tightness, shortness of breath and wheezing. Cardiovascular: Negative for chest pain and palpitations. Gastrointestinal: Negative for abdominal pain, blood in stool, constipation, diarrhea, nausea and vomiting. Genitourinary: Negative for difficulty urinating, frequency, hematuria and urgency. Musculoskeletal: Negative for back pain, joint swelling, myalgias and neck pain. Skin: Positive for rash. Neurological: Negative for dizziness and headaches. Hematological: Negative for adenopathy. Does not bruise/bleed easily. Psychiatric/Behavioral: Negative for behavioral problems and sleep disturbance.  The patient is not nervous/anxious. Physical Exam  Vitals signs and nursing note reviewed. Constitutional:       Appearance: She is well-developed. HENT:      Head: Normocephalic and atraumatic. Right Ear: External ear normal.      Left Ear: External ear normal.      Nose: Nose normal.      Mouth/Throat:      Mouth: Mucous membranes are moist.   Eyes:      Pupils: Pupils are equal, round, and reactive to light. Neck:      Musculoskeletal: Neck supple. Thyroid: No thyromegaly. Cardiovascular:      Rate and Rhythm: Normal rate and regular rhythm. Heart sounds: Normal heart sounds. Pulmonary:      Breath sounds: Normal breath sounds. No wheezing or rales. Abdominal:      General: Bowel sounds are normal.      Palpations: Abdomen is soft. Tenderness: There is no abdominal tenderness. There is no guarding or rebound. Musculoskeletal: Normal range of motion. Lymphadenopathy:      Cervical: No cervical adenopathy. Skin:     General: Skin is warm and dry. Findings: Rash present. Rash is urticarial.          Neurological:      Mental Status: She is alert and oriented to person, place, and time. Cranial Nerves: No cranial nerve deficit. Deep Tendon Reflexes: Reflexes are normal and symmetric. An electronic signature was used to authenticate this note.     --Evelia Vazquez MD

## 2021-04-29 NOTE — TELEPHONE ENCOUNTER
Received call from Franci at [facility]-service center [facility]) [employer].ESTELA with [employer] Complaint. Brief description of triage: rash from neck to knees started today, on the chest it looks similar to sunburn appearance but she was not out in the sun     Triage indicates for patient to be seen today     Care advice provided, patient verbalizes understanding; denies any other questions or concerns; instructed to call back for any new or worsening symptoms. Writer provided warm transfer to Kelli Bennett at [facility] for appointment scheduling. Attention Provider: Thank you for allowing me to participate in the care of your patient. The patient was connected to triage in response to information provided to the ECC. Please do not respond through this encounter as the response is not directed to a shared pool. Reason for Disposition   Hives or itching    Answer Assessment - Initial Assessment Questions  1. APPEARANCE of RASH: \"Describe the rash. \" (e.g., spots, blisters, raised areas, skin peeling, scaly)      Red, raised burning from neck to knees , states, \"splotchy and a little welded up on the stomach and chest is just solid red\"     2. SIZE: \"How big are the spots? \" (e.g., tip of pen, eraser, coin; inches, centimeters)      Little tiny spots under chest but the whole belly and chest and back are completely red and on shoulders     3. LOCATION: \"Where is the rash located? \"      Chest, back , neck,arms , back of legs to knees,     4. COLOR: \"What color is the rash? \" (Note: It is difficult to assess rash color in people with darker-colored skin. When this situation occurs, simply ask the caller to describe what they see.)      All bright red and some pink     5. ONSET: \"When did the rash begin? \"      Woke up today with it     6. FEVER: \"Do you have a fever? \" If so, ask: \"What is your temperature, how was it measured, and when did it start? \"      No     7. ITCHING: \"Does the rash itch? \" If so, ask: \"How bad is the itch? \"

## 2021-04-29 NOTE — PATIENT INSTRUCTIONS
lightheaded or suddenly feel weak, confused, or restless. Call your doctor now or seek immediate medical care if:    · You have symptoms of an allergic reaction, such as:  ? A rash or hives (raised, red areas on the skin). ? Itching. ? Swelling. ? Belly pain, nausea, or vomiting.     · You get hives after you start a new medicine.     · Hives have not gone away after 24 hours. Watch closely for changes in your health, and be sure to contact your doctor if:    · You do not get better as expected. Where can you learn more? Go to [email or web address] [email or web address] org and sign in to your [employer] account. Enter P162 in the Hojo.pl box to learn more about \"Hives: Care Instructions. \"     If you do not have an account, please click on the \"Sign Up Now\" link. Current as of: February 26, 2020               Content Version: 12.8  © 2006-2021 [employer]. Care instructions adapted under license by [facility] ([facility]). If you have questions about a medical condition or this instruction, always ask your healthcare professional. [place] [address] any warranty or liability for your use of this information.

## 2021-04-29 NOTE — TELEPHONE ENCOUNTER
Received call from Suleiman Craft at [facility] with [employer] Complaint. Brief description of triage:   Pt states that she is experiencing loss of smell. She just noticed this today. Pt reports Covid 19 exposure within the last 2 weeks. Pt would like to be tested for Covid 19. Care advice provided, patient verbalizes understanding; denies any other questions or concerns; instructed to call back for any new or worsening symptoms. Writer provided warm transfer to Mian Mcintyre at [facility] for appointment scheduling. Attention Provider: Thank you for allowing me to participate in the care of your patient. The patient was connected to triage in response to information provided to the [facility]. Please do not respond through this encounter as the response is not directed to a shared pool. Reason for Disposition   [1] Symptoms of COVID-19 (e.g., cough, fever, SOB, or others) AND [2] within 14 days of EXPOSURE (close contact) with diagnosed or suspected COVID-19 patient   [1] COVID-19 infection suspected by caller or triager AND [2] mild symptoms (cough, fever, or others) AND [6] no complications or SOB    Answer Assessment - Initial Assessment Questions  1. COVID-19 CLOSE CONTACT: \"Who is the person with the confirmed or suspected COVID-19 infection that you were exposed to?\"      Pt states that someone at her work did test positive for Covid 19 within the last 2 weeks    2. PLACE of CONTACT: \"Where were you when you were exposed to COVID-19? \" (e.g., home, school, medical waiting room; which city?)      At work, [place]    3. TYPE of CONTACT: \"How much contact was there? \" (e.g., sitting next to, live in same house, work in same office, same building)      Less than 6 feet apart, same building     4. DURATION of CONTACT: \"How long were you in contact with the COVID-19 patient? \" (e.g., a few seconds, passed by person, a few minutes, 15 minutes or longer, live with the patient)      Longer than 15 minutes 5. MASK: \"Were you wearing a mask? \" \"Was the other person wearing a mask? \" Note: wearing a mask reduces the risk of an otherwise close contact. Pt and her co worker were wearing masks     6. DATE of CONTACT: \"When did you have contact with a COVID-19 patient? \" (e.g., how many days ago)      Pt is not sure, sometime last week    7. COMMUNITY SPREAD: \"Are there lots of cases of COVID-19 (community spread) where you live? \" (See public health department website, if unsure)        Unknown      8. SYMPTOMS: \"Do you have any symptoms? \" (e.g., fever, cough, breathing difficulty, loss of taste or smell)      Pt has a rash and loss of smell    9. PREGNANCY OR POSTPARTUM: \"Is there any chance you are pregnant? \" \"When was your last menstrual period? \" \"Did you deliver in the last 2 weeks? \"      Denies     10. HIGH RISK: \"Do you have any heart or lung problems? \" \"Do you have a weak immune system? \" (e.g., heart failure, COPD, asthma, HIV positive, chemotherapy, renal failure, diabetes mellitus, sickle cell anemia, obesity)        Denies     11. TRAVEL: \"Have you traveled out of the country recently? \" If so, \"When and where? \" Also ask about out-of-state travel, since the [facility] has identified some high-risk cities for community spread in the [address] [place] [address] [address]. Note: Travel becomes less relevant if there is widespread community transmission where the patient lives.         Denies    Protocols used: CORONAVIRUS (COVID-19) EXPOSURE-ADULT-AH, CORONAVIRUS (COVID-19) DIAGNOSED OR SUSPECTED-ADULT-AH

## 2021-04-30 ENCOUNTER — NURSE TRIAGE (OUTPATIENT)
Dept: OTHER | Facility: CLINIC | Age: 26
End: 2021-04-30

## 2021-04-30 ENCOUNTER — TELEPHONE (OUTPATIENT)
Dept: FAMILY MEDICINE CLINIC | Age: 26
End: 2021-04-30

## 2021-04-30 ENCOUNTER — HOSPITAL ENCOUNTER (EMERGENCY)
Age: 26
Discharge: HOME | End: 2021-04-30
Attending: EMERGENCY MEDICINE
Payer: COMMERCIAL

## 2021-04-30 VITALS
SYSTOLIC BLOOD PRESSURE: 133 MMHG | BODY MASS INDEX: 37.67 KG/M2 | TEMPERATURE: 98.8 F | HEART RATE: 86 BPM | RESPIRATION RATE: 18 BRPM | WEIGHT: 240 LBS | HEIGHT: 67 IN | OXYGEN SATURATION: 100 % | DIASTOLIC BLOOD PRESSURE: 69 MMHG

## 2021-04-30 DIAGNOSIS — R43.0 LOSS OF SENSE OF SMELL: Primary | ICD-10-CM

## 2021-04-30 DIAGNOSIS — U07.1 COVID-19: ICD-10-CM

## 2021-04-30 DIAGNOSIS — L50.9 URTICARIA: Primary | ICD-10-CM

## 2021-04-30 LAB — SARS-COV-2, NAAT: DETECTED

## 2021-04-30 PROCEDURE — 6360000002 HC RX W HCPCS: Performed by: EMERGENCY MEDICINE

## 2021-04-30 PROCEDURE — 87635 SARS-COV-2 COVID-19 AMP PRB: CPT

## 2021-04-30 PROCEDURE — 6370000000 HC RX 637 (ALT 250 FOR IP): Performed by: EMERGENCY MEDICINE

## 2021-04-30 PROCEDURE — 96372 THER/PROPH/DIAG INJ SC/IM: CPT

## 2021-04-30 PROCEDURE — 99283 EMERGENCY DEPT VISIT LOW MDM: CPT

## 2021-04-30 RX ORDER — FAMOTIDINE 20 MG/1
20 TABLET, FILM COATED ORAL ONCE
Status: COMPLETED | OUTPATIENT
Start: 2021-04-30 | End: 2021-04-30

## 2021-04-30 RX ORDER — RANITIDINE 150 MG/1
150 TABLET ORAL 2 TIMES DAILY
Qty: 20 TABLET | Refills: 0 | Status: SHIPPED | OUTPATIENT
Start: 2021-04-30 | End: 2021-05-10

## 2021-04-30 RX ORDER — DIPHENHYDRAMINE HYDROCHLORIDE 50 MG/ML
25 INJECTION INTRAMUSCULAR; INTRAVENOUS ONCE
Status: DISCONTINUED | OUTPATIENT
Start: 2021-04-30 | End: 2021-04-30

## 2021-04-30 RX ORDER — DIPHENHYDRAMINE HCL 25 MG
25 CAPSULE ORAL EVERY 6 HOURS
Qty: 40 CAPSULE | Refills: 0 | Status: SHIPPED | OUTPATIENT
Start: 2021-04-30 | End: 2021-05-10

## 2021-04-30 RX ORDER — PREDNISONE 20 MG/1
40 TABLET ORAL ONCE
Status: DISCONTINUED | OUTPATIENT
Start: 2021-04-30 | End: 2021-04-30

## 2021-04-30 RX ORDER — DIPHENHYDRAMINE HYDROCHLORIDE 50 MG/ML
25 INJECTION INTRAMUSCULAR; INTRAVENOUS ONCE
Status: COMPLETED | OUTPATIENT
Start: 2021-04-30 | End: 2021-04-30

## 2021-04-30 RX ADMIN — DIPHENHYDRAMINE HYDROCHLORIDE 25 MG: 50 INJECTION, SOLUTION INTRAMUSCULAR; INTRAVENOUS at 13:36

## 2021-04-30 RX ADMIN — FAMOTIDINE 20 MG: 20 TABLET, FILM COATED ORAL at 13:31

## 2021-04-30 ASSESSMENT — ENCOUNTER SYMPTOMS
SHORTNESS OF BREATH: 0
BACK PAIN: 0
SINUS PRESSURE: 0
ABDOMINAL PAIN: 0
DIARRHEA: 0
SINUS PAIN: 0
EYE PAIN: 0
COUGH: 1
CONSTIPATION: 0
CHEST TIGHTNESS: 0
NAUSEA: 0

## 2021-04-30 ASSESSMENT — PAIN DESCRIPTION - PAIN TYPE: TYPE: ACUTE PAIN

## 2021-04-30 ASSESSMENT — PAIN DESCRIPTION - DESCRIPTORS: DESCRIPTORS: BURNING;ITCHING

## 2021-04-30 ASSESSMENT — PAIN SCALES - GENERAL: PAINLEVEL_OUTOF10: 6

## 2021-04-30 NOTE — TELEPHONE ENCOUNTER
Pt is at home caring for her 3 kids, she is unsure what she is going to do at this time    Placed order for COVID test in case patient wants to get that done after office hours.

## 2021-04-30 NOTE — TELEPHONE ENCOUNTER
Received call from Luis Carlos 146 at pre-service center [facility]) [address] with [employer] Complaint. Brief description of triage: see below    Triage indicates for patient to call PCP now. Attempted to call [facility] for transfer to PCP. Spoke with Kelsie Moreland at [facility] and transferred pt over. Provider unavailable to speak to. Care advice provided, patient verbalizes understanding; denies any other questions or concerns; instructed to call back for any new or worsening symptoms. Writer provided warm transfer to [place] at [facility] for transfer to [facility]. Attention Provider: Thank you for allowing me to participate in the care of your patient. The patient was connected to triage in response to information provided to the [facility]. Please do not respond through this encounter as the response is not directed to a shared pool. Reason for Disposition   [1] HIGH RISK patient (e.g., age > 59 years, diabetes, heart or lung disease, weak immune system) AND [2] new or worsening symptoms    Answer Assessment - Initial Assessment Questions  1. COVID-19 DIAGNOSIS: \"Who made your Coronavirus (COVID-19) diagnosis? \" \"Was it confirmed by a positive lab test?\" If not diagnosed by a HCP, ask \"Are there lots of cases (community spread) where you live? \" (See public health department website, if unsure)      Pt denies testing in last 14 days    2. COVID-19 EXPOSURE: \"Was there any known exposure to COVID before the symptoms began? \" CDC Definition of close contact: within 6 feet (2 meters) for a total of 15 minutes or more over a 24-hour period. Pt stated someone at her work tested positive at the end of last week    3. ONSET: \"When did the COVID-19 symptoms start? \"       Pt stated yesterday morning rash started and lost sense of smell after seeing PCP    4. WORST SYMPTOM: \"What is your worst symptom? \" (e.g., cough, fever, shortness of breath, muscle aches)      Rash - all over her body today - ; seen yesterday am for it and given abx for it; worsened today with swelling and with a spot on her eyelid    5. COUGH: \"Do you have a cough? \" If so, ask: \"How bad is the cough? \"        Pt denies    6. FEVER: \"Do you have a fever? \" If so, ask: \"What is your temperature, how was it measured, and when did it start? \"      Pt denies    7. RESPIRATORY STATUS: \"Describe your breathing? \" (e.g., shortness of breath, wheezing, unable to speak)       Pt denies    8. BETTER-SAME-WORSE: Magaly Half you getting better, staying the same or getting worse compared to yesterday? \"  If getting worse, ask, \"In what way? \"      Pt worse today    9. HIGH RISK DISEASE: \"Do you have any chronic medical problems? \" (e.g., asthma, heart or lung disease, weak immune system, obesity, etc.)      Pt states childhood asthma    10. PREGNANCY: \"Is there any chance you are pregnant? \" \"When was your last menstrual period? \"        Pt denies - LMP at this time    11. OTHER SYMPTOMS: \"Do you have any other symptoms? \"  (e.g., chills, fatigue, headache, loss of smell or taste, muscle pain, sore throat; new loss of smell or taste especially support the diagnosis of COVID-19)        Pt lost her sense of smell    Protocols used: CORONAVIRUS (COVID-19) DIAGNOSED OR [facility]USPECT[facility]

## 2021-04-30 NOTE — ED PROVIDER NOTES
Procedure Laterality Date    KNEE SURGERY Right     LAPAROSCOPIC APPENDECTOMY N/A 10/19/2017    APPENDECTOMY LAPAROSCOPIC performed by Pat Mann MD at [address]   No current facility-administered medications for this encounter. Current Outpatient Medications:     diphenhydrAMINE (BENADRYL ALLERGY) 25 MG capsule, Take 1 capsule by mouth every 6 hours for 10 days, Disp: 40 capsule, Rfl: 0    raNITIdine (ZANTAC) 150 MG tablet, Take 1 tablet by mouth 2 times daily for 10 days, Disp: 20 tablet, Rfl: 0    predniSONE (DELTASONE) 20 MG tablet, Take 1 tab BID x 5 days, then 1 tab daily x 5 days. Take with food. , Disp: 15 tablet, Rfl: 0      SOCIAL HISTORY     Social History     Social History Narrative    Not on file     Social History     Tobacco Use    Smoking status: Current Every Day Smoker     Packs/day: 0.50     Types: Cigarettes    Smokeless tobacco: Never Used   Substance Use Topics    Alcohol use: No    Drug use: No         ALLERGIES   No Known Allergies      FAMILY HISTORY     Family History   Problem Relation Age of Onset    Bipolar Disorder Father     Depression Father     Schizophrenia Father     Arthritis Maternal Grandmother     Asthma Maternal Grandmother     Cancer Maternal Grandmother     Depression Maternal Grandmother     Early Death Maternal Grandmother     High Blood Pressure Maternal Grandmother     Arthritis Maternal Grandfather          PREVIOUS RECORDS   Previous records reviewed:   Patient was seen by PCP yesterday who applied intramuscular methylprednisolone and hydrocortisone and started patient on prednisone 20 mg twice daily for 5 days. PHYSICAL EXAM     ED Triage Vitals [04/30/21 1313]   BP Temp Temp Source Pulse Resp SpO2 Height Weight   133/69 98.8 °F (37.1 °C) Oral 86 18 100 % 5' 7\" (1.702 m) 240 lb (108.9 kg)     Initial vital signs and nursing assessment reviewed and normal. Body mass index is 37.59 kg/m².  Pulsoximetry is normal per my interpretation. Additional Vital Signs:  Vitals:    04/30/21 1313   BP: 133/69   Pulse: 86   Resp: 18   Temp: 98.8 °F (37.1 °C)   SpO2: 100%       Physical Exam  Vitals signs and nursing note reviewed. Constitutional:       General: She is not in acute distress. Appearance: Normal appearance. She is well-developed. HENT:      Head: Normocephalic and atraumatic. Right Ear: External ear normal.      Left Ear: External ear normal.      Nose: Nose normal. No congestion or rhinorrhea. Mouth/Throat:      Mouth: Mucous membranes are moist.      Pharynx: No posterior oropharyngeal erythema. Eyes:      Conjunctiva/sclera: Conjunctivae normal.      Pupils: Pupils are equal, round, and reactive to light. Neck:      Musculoskeletal: Neck supple. Vascular: No JVD. Cardiovascular:      Rate and Rhythm: Normal rate and regular rhythm. Heart sounds: Normal heart sounds. No murmur. No friction rub. No gallop. Pulmonary:      Effort: Pulmonary effort is normal.      Breath sounds: Normal breath sounds. No stridor. No wheezing or rales. Skin:     General: Skin is warm and dry. Comments: Diffuse erythematous, wheal rash with confluence to the entire body surface, without involvement soles, palms, mouth. Neurological:      Mental Status: She is alert and oriented to person, place, and time. Psychiatric:         Behavior: Behavior normal.             MEDICAL DECISION MAKING   Initial Assessment:   1. Urticaria  2. Rule out COVID-19  Plan:    Continue steroids   We'll add antihistamines   Covid swab   Observation.         ED RESULTS   Laboratory results:  Labs Reviewed   COVID-19, RAPID - Abnormal; Notable for the following components:       Result Value    SARS-CoV-2, NAAT DETECTED (*)     All other components within normal limits       Radiologic studies results:  No orders to display             ED COURSE   ED Medications administered this visit:   Medications   famotidine (PEPCID) tablet 20 mg (20 mg Oral Given 4/30/21 1331)   diphenhydrAMINE (BENADRYL) injection 25 mg (25 mg Intramuscular Given 4/30/21 1336)       ED Course as of Apr 30 1419 Fri Apr 30, 2021   1417 SARS-CoV-2, NAAT(!!): DETECTED [DT]   1418 I have sent the patient's PCP follow-up message through our EMR, also informing of the COVID-19 results. [DT]      ED Course User Index  [DT] Jesse Alejo MD     Strict return precautions and follow up instructions were discussed with the patient prior to discharge, with which the patient agrees. MEDICATION CHANGES     New Prescriptions    DIPHENHYDRAMINE (BENADRYL ALLERGY) 25 MG CAPSULE    Take 1 capsule by mouth every 6 hours for 10 days    RANITIDINE (ZANTAC) 150 MG TABLET    Take 1 tablet by mouth 2 times daily for 10 days         FINAL DISPOSITION     Final diagnoses:   Urticaria   COVID-19     Condition: condition: good  Dispo: Discharge to home      This transcription was electronically signed. It was dictated by use of voice recognition software and electronically transcribed. The transcription may contain errors not detected in proofreading.        Jesse Alejo MD  04/30/21 3066

## 2021-05-03 ENCOUNTER — CARE COORDINATION (OUTPATIENT)
Dept: CARE COORDINATION | Age: 26
End: 2021-05-03

## 2021-05-03 NOTE — CARE COORDINATION
Attempted to reach patient for ED follow up in regards to COVID-19 education/ monitoring. Patient was unavailable at the time of my call, and a generic voicemail message was left asking patient to return my call at [phone].

## 2023-12-12 NOTE — ED PROVIDER NOTES
[address]  Urgent Care Encounter      CHIEF COMPLAINT       Chief Complaint   Patient presents with    Abdominal Pain       Nurses Notes reviewed and I agree except as noted in the HPI. HISTORY OF PRESENT ILLNESS   Marcus Rubio is a 23 y.o. female who presents with abdominal pain. Patient says she felt fine when she went bed last night the pain woke her up during the night. She had 5 episodes of vomiting this morning no change in her bowel pattern she denies any fever, chills or nausea. She is  and has 2 children. She said her menses are due to start 4 days. She is not currently using anything to prevent pregnancy. She is resting on her back on the urgent care cart, relaxed posture,no acute distress. REVIEW OF SYSTEMS     Review of Systems   Constitutional: Negative for appetite change, chills, fatigue and fever. Gastrointestinal: Positive for abdominal pain (right upper and lower quadrants) and vomiting (x 5 early this morning). Negative for blood in stool, constipation, diarrhea and nausea. Genitourinary: Negative for dysuria, flank pain, pelvic pain and urgency. PAST MEDICAL HISTORY         Diagnosis Date    Asthma not on meds    Depression        SURGICAL HISTORY     Patient  has a past surgical history that includes knee surgery (Right). CURRENT MEDICATIONS       Previous Medications    BUPROPION (WELLBUTRIN) 75 MG TABLET    Take 1 tablet by mouth daily    DIVALPROEX (DEPAKOTE) 250 MG DR TABLET    Take 1 tablet by mouth 2 times daily    TRAZODONE (DESYREL) 50 MG TABLET    Take 1 tablet by mouth nightly       ALLERGIES     Patient is has No Known Allergies.     FAMILY HISTORY     Patient's family history includes Arthritis in her maternal grandfather, maternal grandmother, and mother; Asthma in her maternal grandmother; Bipolar Disorder in her father; Cancer in her maternal grandmother; Depression in her father, maternal grandmother, and mother; Early Physical Therapy    Physical Therapy Treatment    Patient Name: Tara Tierney  MRN: [id number]  Today's Date: 12/12/2023  Time Calculation  Start Time: 1055  Stop Time: 1118  Time Calculation (min): 23 min       Assessment/Plan   End of Session Communication: Bedside nurse, PCT/NA/CTA  Assessment Comment: Call-light, phone, and traytable within reach.  End of Session Patient Position: Up in chair, Alarm off, not on at start of session (Ice pack on back.)  PT Plan  Inpatient/Swing Bed or Outpatient: Inpatient  Treatment/Interventions: Transfer training, Gait training  PT Plan: Skilled PT  PT Frequency: Daily  PT Discharge Recommendations: Moderate intensity level of continued care, High intensity level of continued care  Equipment Recommended upon Discharge: Wheeled walker   PT Recommended Transfer Status: Assist x1    General Visit Information:   PT  Visit  PT Received On: 12/12/23  General  Family/Caregiver Present: No  Prior to Session Communication: Bedside nurse, PCT/NA/CTA  Patient Position Received: Up in chair, Alarm on  General Comment: Patient had Lumbar I&D yesterday; ok to continue with POC per following PT.    General Observations:   General Observation: x2 JOSE drains; TLSO brace donned(in standing 2° unable to secure it while sitting).    Subjective     Precautions:  Precautions  Medical Precautions: Fall precautions  Post-Surgical Precautions: Spinal precautions  Braces Applied: TLSO brace when OOB  Precautions Comment: Recent L3-4, L4-5, L5-S1 Laminectomy([facility]). Had I&D (12/06/23) and second I&D(12/11/23)    Objective     Pain:  Pain Assessment: 0-10 (8/10 back and (R)groin. Patient states groin pain was a little less with amb, but doesn't go away. Ice pack for back at end of session. Nursing aware.)    Treatments:           Ambulation/Gait Training  Ambulation/Gait Training Performed: Yes  Ambulation/Gait Training 1  Surface 1: Level tile  Device 1: Rolling walker  Assistance 1: Minimum  Death in her maternal grandmother; High Blood Pressure in her maternal grandmother; Schizophrenia in her father. SOCIAL HISTORY     Patient  reports that she has been smoking Cigarettes. She has been smoking about 0.50 packs per day. She has never used smokeless tobacco. She reports that she does not drink alcohol or use drugs. PHYSICAL EXAM     ED TRIAGE VITALS  BP: 124/67, Temp: 98.5 °F (36.9 °C), Pulse: 95, Resp: 18, SpO2: 99 %  Physical Exam   Constitutional: She is oriented to person, place, and time. She appears well-developed and well-nourished. No distress. HENT:   Head: Normocephalic and atraumatic. Neck: Neck supple. Cardiovascular: Normal rate, regular rhythm and normal heart sounds. No murmur heard. Pulmonary/Chest: Effort normal and breath sounds normal.   Abdominal: Soft. Normal appearance. She exhibits no distension and no mass. There is no hepatomegaly. There is tenderness in the right upper quadrant, right lower quadrant and epigastric area. There is no rebound, no guarding and no CVA tenderness. Abdomen is soft non distended bowel sounds active. Tympanic to percussion positive to palpation over the epigastric area with increased tenderness to the right upper quadrant and right lower quadrant no rebound or guarding. Lymphadenopathy:     She has no cervical adenopathy. Neurological: She is alert and oriented to person, place, and time. Skin: Skin is warm and dry. Psychiatric: She has a normal mood and affect. Her behavior is normal.   Nursing note and vitals reviewed.       DIAGNOSTIC RESULTS   Labs:  Results for orders placed or performed during the hospital encounter of 10/19/17   CBC auto differential   Result Value Ref Range    WBC 15.0 (H) 4.8 - 10.8 thou/mm3    RBC 4.74 4.20 - 5.40 mill/mm3    Hemoglobin 13.7 12.0 - 16.0 gm/dl    Hematocrit 42.6 37.0 - 47.0 %    MCV 90 81 - 99 fL    MCH 28.9 27.0 - 31.0 pg    MCHC 32.2 (L) 33.0 - 37.0 gm/dl    RDW 11.5 11.5 - 14.5 % assistance  Comments/Distance (ft) 1: ~50ft x2. (A) to don TLSO braces while standing. NBOS. Slow cathi. Step through gait pattern. Decreased step height/length B. v/c and (A) for safer maneuvering of ww with 90/180° turns to avoid twisting. No LOB. No buckling of knees.  Transfers  Transfer: Yes  Transfer 1  Technique 1: Sit to stand, Stand to sit  Transfer Device 1:  (ww)  Transfer Level of Assistance 1: Minimum assistance  Trials/Comments 1: (2x). v/c for safe hand placement and technique. Slow transition of hands to/from ww. Benefits from elevated surfaces.          Outcome Measures:  [facility] Basic Mobility  Turning from your back to your side while in a flat bed without using bedrails: A lot  Moving from lying on your back to sitting on the side of a flat bed without using bedrails: A lot  Moving to and from bed to chair (including a wheelchair): A little  Standing up from a chair using your arms (e.g. wheelchair or bedside chair): A little  To walk in hospital room: A little  Climbing 3-5 steps with railing: A lot  Basic Mobility - Total Score: 15    Education Documentation  Precautions, taught by Jeanine rByan PTA at 12/12/2023  1:21 PM.  Learner: Patient  Readiness: Acceptance  Method: Explanation, Demonstration  Response: Verbalizes Understanding, Needs Reinforcement  Comment: See therapy note.    Body Mechanics, taught by Jeanine Bryan PTA at 12/12/2023  1:21 PM.  Learner: Patient  Readiness: Acceptance  Method: Explanation, Demonstration  Response: Verbalizes Understanding, Needs Reinforcement  Comment: See therapy note.    Mobility Training, taught by Jeanine Bryan PTA at 12/12/2023  1:21 PM.  Learner: Patient  Readiness: Acceptance  Method: Explanation, Demonstration  Response: Verbalizes Understanding, Needs Reinforcement  Comment: See therapy note.    EDUCATION:  Individual(s) Educated: Patient  Education Provided: Body Mechanics, Fall Risk, Home Safety, POC, Post-Op Precautions, Posture  (Safety with transfers/amb using ww properly for support.)    Encounter Problems       Encounter Problems (Active)       PT Problem       Pt will demonstrate sup > sit and sit > sup bed mobility mod I with log roll technique (Progressing)       Start:  12/07/23    Expected End:  12/21/23            Pt will demo sit > stand and stand > sit transfer with ww and mod I  (Progressing)       Start:  12/07/23    Expected End:  12/21/23            Pt will ambulate 100' with ww and mod I, without LOB  (Progressing)       Start:  12/07/23    Expected End:  12/21/23            Pt will demo up/down 1 steps with handrail mod I (Progressing)       Start:  12/07/23    Expected End:  12/21/23                             Platelets 026 728 - 864 thou/mm3    MPV 9.9 7.4 - 10.4 mcm   hCG, serum, qualitative   Result Value Ref Range    Preg, Serum NEGATIVE NEGATIVE       IMAGING:  CT ABDOMEN PELVIS W IV CONTRAST Additional Contrast? Oral   Final Result   1. Findings are consistent with early acute appendicitis. No evidence of abscess or perforation at this time. **This report has been created using voice recognition software. It may contain minor errors which are inherent in voice recognition technology. **      Final report electronically signed by Dr. Teresa Russell on 10/19/2017 3:08 PM        URGENT CARE COURSE:     Vitals:    10/19/17 1210   BP: 124/67   Pulse: 95   Resp: 18   Temp: 98.5 °F (36.9 °C)   TempSrc: Temporal   SpO2: 99%   Weight: 220 lb (99.8 kg)   Height: 5' 6\" (1.676 m)       Medications - No data to display  PROCEDURES:  None  FINAL IMPRESSION      1. Abdominal pain, right lower quadrant    2. Acute appendicitis, unspecified acute appendicitis type        DISPOSITION/PLAN   DISPOSITION   Discussed with patient elevated WBCs and early appendicitis finding on CAT scan. Recommend transfer to [address] [place] [address] emergency department for further evaluation and treatment. Advised the patient not to have anything to eat or drink until seen by the physician. She agrees to be transferred by ambulance. She was stable at the time of transfer. Report was given to Jahaira Hughes [place] charge nurse. transferred by [facility]. PATIENT REFERRED TO:  No follow-up provider specified.   DISCHARGE MEDICATIONS:  New Prescriptions    No medications on file     Current Discharge Medication List          Jose Juan Navarrete, [facility] [address], CNP  10/19/17 2213 [facility], [facility]  01/18/18 7758

## 2025-08-31 ENCOUNTER — APPOINTMENT (OUTPATIENT)
Dept: CT IMAGING | Age: 30
DRG: 885 | End: 2025-08-31
Payer: COMMERCIAL

## 2025-08-31 ENCOUNTER — HOSPITAL ENCOUNTER (INPATIENT)
Age: 30
LOS: 3 days | Discharge: HOME | DRG: 885 | End: 2025-09-04
Attending: EMERGENCY MEDICINE | Admitting: PSYCHIATRY & NEUROLOGY
Payer: COMMERCIAL

## 2025-08-31 DIAGNOSIS — F32.A DEPRESSION WITH SUICIDAL IDEATION: Primary | ICD-10-CM

## 2025-08-31 DIAGNOSIS — F10.90 ALCOHOL USE: ICD-10-CM

## 2025-08-31 DIAGNOSIS — R45.851 DEPRESSION WITH SUICIDAL IDEATION: Primary | ICD-10-CM

## 2025-08-31 LAB
ALBUMIN SERPL BCG-MCNC: 4.5 G/DL (ref 3.4–4.9)
ALP SERPL-CCNC: 69 U/L (ref 38–126)
ALT SERPL W/O P-5'-P-CCNC: 20 U/L (ref 10–35)
ANION GAP SERPL CALC-SCNC: 17 MEQ/L (ref 8–16)
APAP SERPL-MCNC: < 5 UG/ML (ref 10–30)
AST SERPL-CCNC: 30 U/L (ref 10–35)
B-HCG SERPL QL: NEGATIVE
BASOPHILS ABSOLUTE: 0 THOU/MM3 (ref 0–0.1)
BASOPHILS NFR BLD AUTO: 0.4 %
BILIRUB CONJ SERPL-MCNC: 0.2 MG/DL (ref 0–0.2)
BILIRUB SERPL-MCNC: 0.6 MG/DL (ref 0.3–1.2)
BUN SERPL-MCNC: 8 MG/DL (ref 8–23)
CALCIUM SERPL-MCNC: 9.7 MG/DL (ref 8.5–10.5)
CHLORIDE SERPL-SCNC: 107 MEQ/L (ref 98–111)
CO2 SERPL-SCNC: 22 MEQ/L (ref 22–29)
CREAT SERPL-MCNC: 0.8 MG/DL (ref 0.5–0.9)
DEPRECATED RDW RBC AUTO: 43.8 FL (ref 35–45)
EOSINOPHIL NFR BLD AUTO: 1 %
EOSINOPHILS ABSOLUTE: 0.1 THOU/MM3 (ref 0–0.4)
ERYTHROCYTE [DISTWIDTH] IN BLOOD BY AUTOMATED COUNT: 12.3 % (ref 11.5–14.5)
ETHANOL SERPL-MCNC: 0.27 % (ref 0–0.08)
GFR SERPL CREATININE-BSD FRML MDRD: > 90 ML/MIN/1.73M2
GLUCOSE SERPL-MCNC: 91 MG/DL (ref 74–109)
HCT VFR BLD AUTO: 45.3 % (ref 37–47)
HGB BLD-MCNC: 15.6 GM/DL (ref 12–16)
IMM GRANULOCYTES # BLD AUTO: 0.03 THOU/MM3 (ref 0–0.07)
IMM GRANULOCYTES NFR BLD AUTO: 0.3 %
LIPASE SERPL-CCNC: 33 U/L (ref 13–60)
LYMPHOCYTES ABSOLUTE: 3.1 THOU/MM3 (ref 1–4.8)
LYMPHOCYTES NFR BLD AUTO: 31.8 %
MAGNESIUM SERPL-MCNC: 2.1 MG/DL (ref 1.6–2.6)
MCH RBC QN AUTO: 33.5 PG (ref 26–33)
MCHC RBC AUTO-ENTMCNC: 34.4 GM/DL (ref 32.2–35.5)
MCV RBC AUTO: 97.2 FL (ref 81–99)
MONOCYTES ABSOLUTE: 0.5 THOU/MM3 (ref 0.4–1.3)
MONOCYTES NFR BLD AUTO: 4.8 %
NEUTROPHILS ABSOLUTE: 5.9 THOU/MM3 (ref 1.8–7.7)
NEUTROPHILS NFR BLD AUTO: 61.7 %
NRBC BLD AUTO-RTO: 0 /100 WBC
OSMOLALITY SERPL CALC.SUM OF ELEC: 288.5 MOSMOL/KG (ref 275–300)
PLATELET # BLD AUTO: 276 THOU/MM3 (ref 130–400)
PMV BLD AUTO: 11.4 FL (ref 9.4–12.4)
POTASSIUM SERPL-SCNC: 3.9 MEQ/L (ref 3.5–5.2)
PROT SERPL-MCNC: 7.4 G/DL (ref 6.4–8.3)
RBC # BLD AUTO: 4.66 MILL/MM3 (ref 4.2–5.4)
SALICYLATES SERPL-MCNC: < 0.5 MG/DL (ref 2–10)
SODIUM SERPL-SCNC: 146 MEQ/L (ref 135–145)
WBC # BLD AUTO: 9.6 THOU/MM3 (ref 4.8–10.8)

## 2025-08-31 PROCEDURE — 93005 ELECTROCARDIOGRAM TRACING: CPT | Performed by: EMERGENCY MEDICINE

## 2025-08-31 PROCEDURE — 82077 ASSAY SPEC XCP UR&BREATH IA: CPT

## 2025-08-31 PROCEDURE — 85025 COMPLETE CBC W/AUTO DIFF WBC: CPT

## 2025-08-31 PROCEDURE — 70450 CT HEAD/BRAIN W/O DYE: CPT

## 2025-08-31 PROCEDURE — 6360000004 HC RX CONTRAST MEDICATION: Performed by: EMERGENCY MEDICINE

## 2025-08-31 PROCEDURE — 83690 ASSAY OF LIPASE: CPT

## 2025-08-31 PROCEDURE — 71260 CT THORAX DX C+: CPT

## 2025-08-31 PROCEDURE — 76376 3D RENDER W/INTRP POSTPROCES: CPT

## 2025-08-31 PROCEDURE — 82248 BILIRUBIN DIRECT: CPT

## 2025-08-31 PROCEDURE — 72125 CT NECK SPINE W/O DYE: CPT

## 2025-08-31 PROCEDURE — 36415 COLL VENOUS BLD VENIPUNCTURE: CPT

## 2025-08-31 PROCEDURE — 80143 DRUG ASSAY ACETAMINOPHEN: CPT

## 2025-08-31 PROCEDURE — 74177 CT ABD & PELVIS W/CONTRAST: CPT

## 2025-08-31 PROCEDURE — 99285 EMERGENCY DEPT VISIT HI MDM: CPT

## 2025-08-31 PROCEDURE — 80179 DRUG ASSAY SALICYLATE: CPT

## 2025-08-31 PROCEDURE — 80053 COMPREHEN METABOLIC PANEL: CPT

## 2025-08-31 PROCEDURE — 84703 CHORIONIC GONADOTROPIN ASSAY: CPT

## 2025-08-31 PROCEDURE — 83735 ASSAY OF MAGNESIUM: CPT

## 2025-08-31 RX ORDER — IOPAMIDOL 755 MG/ML
80 INJECTION, SOLUTION INTRAVASCULAR
Status: COMPLETED | OUTPATIENT
Start: 2025-08-31 | End: 2025-08-31

## 2025-08-31 RX ADMIN — IOPAMIDOL 80 ML: 755 INJECTION, SOLUTION INTRAVENOUS at 23:25

## 2025-09-01 PROBLEM — F33.2 MDD (MAJOR DEPRESSIVE DISORDER), RECURRENT SEVERE, WITHOUT PSYCHOSIS (HCC): Status: ACTIVE | Noted: 2025-09-01

## 2025-09-01 LAB
AMPHETAMINES UR QL SCN: NEGATIVE
BACTERIA URNS QL MICRO: ABNORMAL /HPF
BARBITURATES UR QL SCN: NEGATIVE
BENZODIAZ UR QL SCN: NEGATIVE
BILIRUB UR QL STRIP.AUTO: NEGATIVE
BUPRENORPHINE URINE: NEGATIVE
BZE UR QL SCN: NEGATIVE
CANNABINOIDS UR QL SCN: NEGATIVE
CASTS #/AREA URNS LPF: ABNORMAL /LPF
CASTS 2: ABNORMAL /LPF
CHARACTER UR: CLEAR
COLOR, UA: ABNORMAL
CRYSTALS URNS MICRO: ABNORMAL
EKG ATRIAL RATE: 88 BPM
EKG P AXIS: 81 DEGREES
EKG P-R INTERVAL: 136 MS
EKG Q-T INTERVAL: 384 MS
EKG QRS DURATION: 84 MS
EKG QTC CALCULATION (BAZETT): 464 MS
EKG R AXIS: 58 DEGREES
EKG T AXIS: 57 DEGREES
EKG VENTRICULAR RATE: 88 BPM
EPITHELIAL CELLS, UA: ABNORMAL /HPF
ETHANOL SERPL-MCNC: 0.05 % (ref 0–0.08)
FENTANYL: NEGATIVE
GLUCOSE UR QL STRIP.AUTO: NEGATIVE MG/DL
HGB UR QL STRIP.AUTO: NEGATIVE
KETONES UR QL STRIP.AUTO: NEGATIVE
MISCELLANEOUS 2: ABNORMAL
NITRITE UR QL STRIP: NEGATIVE
OPIATES UR QL SCN: NEGATIVE
OXYCODONE: NEGATIVE
PCP UR QL SCN: NEGATIVE
PH UR STRIP.AUTO: 5.5 [PH] (ref 5–9)
PROT UR STRIP.AUTO-MCNC: NEGATIVE MG/DL
RBC URINE: ABNORMAL /HPF
RENAL EPI CELLS #/AREA URNS HPF: ABNORMAL /[HPF]
SP GR UR REFRACT.AUTO: > 1.03 (ref 1–1.03)
UROBILINOGEN, URINE: 0.2 EU/DL (ref 0–1)
WBC #/AREA URNS HPF: ABNORMAL /HPF
WBC #/AREA URNS HPF: ABNORMAL /[HPF]
YEAST LIKE FUNGI URNS QL MICRO: ABNORMAL

## 2025-09-01 PROCEDURE — 36415 COLL VENOUS BLD VENIPUNCTURE: CPT

## 2025-09-01 PROCEDURE — 80307 DRUG TEST PRSMV CHEM ANLYZR: CPT

## 2025-09-01 PROCEDURE — 93010 ELECTROCARDIOGRAM REPORT: CPT | Performed by: INTERNAL MEDICINE

## 2025-09-01 PROCEDURE — 2580000003 HC RX 258

## 2025-09-01 PROCEDURE — 1240000000 HC EMOTIONAL WELLNESS R&B

## 2025-09-01 PROCEDURE — 82077 ASSAY SPEC XCP UR&BREATH IA: CPT

## 2025-09-01 PROCEDURE — 81001 URINALYSIS AUTO W/SCOPE: CPT

## 2025-09-01 RX ORDER — TRAZODONE HYDROCHLORIDE 50 MG/1
50 TABLET ORAL NIGHTLY PRN
Status: DISCONTINUED | OUTPATIENT
Start: 2025-09-01 | End: 2025-09-04 | Stop reason: HOSPADM

## 2025-09-01 RX ORDER — MAGNESIUM HYDROXIDE/ALUMINUM HYDROXICE/SIMETHICONE 120; 1200; 1200 MG/30ML; MG/30ML; MG/30ML
30 SUSPENSION ORAL EVERY 6 HOURS PRN
Status: DISCONTINUED | OUTPATIENT
Start: 2025-09-01 | End: 2025-09-04 | Stop reason: HOSPADM

## 2025-09-01 RX ORDER — IBUPROFEN 400 MG/1
400 TABLET, FILM COATED ORAL EVERY 6 HOURS PRN
Status: DISCONTINUED | OUTPATIENT
Start: 2025-09-01 | End: 2025-09-04 | Stop reason: HOSPADM

## 2025-09-01 RX ORDER — HYDROXYZINE HYDROCHLORIDE 25 MG/1
50 TABLET, FILM COATED ORAL 3 TIMES DAILY PRN
Status: DISCONTINUED | OUTPATIENT
Start: 2025-09-01 | End: 2025-09-04 | Stop reason: HOSPADM

## 2025-09-01 RX ORDER — ACETAMINOPHEN 325 MG/1
650 TABLET ORAL EVERY 4 HOURS PRN
Status: DISCONTINUED | OUTPATIENT
Start: 2025-09-01 | End: 2025-09-04 | Stop reason: HOSPADM

## 2025-09-01 RX ORDER — NICOTINE 21 MG/24HR
1 PATCH, TRANSDERMAL 24 HOURS TRANSDERMAL DAILY
Status: DISCONTINUED | OUTPATIENT
Start: 2025-09-01 | End: 2025-09-04 | Stop reason: HOSPADM

## 2025-09-01 RX ORDER — 0.9 % SODIUM CHLORIDE 0.9 %
1000 INTRAVENOUS SOLUTION INTRAVENOUS ONCE
Status: COMPLETED | OUTPATIENT
Start: 2025-09-01 | End: 2025-09-01

## 2025-09-01 RX ADMIN — SODIUM CHLORIDE 1000 ML: 9 INJECTION, SOLUTION INTRAVENOUS at 02:52

## 2025-09-01 ASSESSMENT — SLEEP AND FATIGUE QUESTIONNAIRES
DO YOU USE A SLEEP AID: NO
DO YOU USE A SLEEP AID: NO
DO YOU HAVE DIFFICULTY SLEEPING: NO
AVERAGE NUMBER OF SLEEP HOURS: 8
AVERAGE NUMBER OF SLEEP HOURS: 8
DO YOU HAVE DIFFICULTY SLEEPING: NO

## 2025-09-01 ASSESSMENT — PATIENT HEALTH QUESTIONNAIRE - PHQ9
SUM OF ALL RESPONSES TO PHQ QUESTIONS 1-9: 0
SUM OF ALL RESPONSES TO PHQ QUESTIONS 1-9: 0
1. LITTLE INTEREST OR PLEASURE IN DOING THINGS: NOT AT ALL
SUM OF ALL RESPONSES TO PHQ QUESTIONS 1-9: 0
2. FEELING DOWN, DEPRESSED OR HOPELESS: NOT AT ALL
SUM OF ALL RESPONSES TO PHQ QUESTIONS 1-9: 0
2. FEELING DOWN, DEPRESSED OR HOPELESS: NOT AT ALL
SUM OF ALL RESPONSES TO PHQ QUESTIONS 1-9: 0
1. LITTLE INTEREST OR PLEASURE IN DOING THINGS: NOT AT ALL

## 2025-09-01 ASSESSMENT — LIFESTYLE VARIABLES
HOW OFTEN DO YOU HAVE A DRINK CONTAINING ALCOHOL: 4 OR MORE TIMES A WEEK
HOW MANY STANDARD DRINKS CONTAINING ALCOHOL DO YOU HAVE ON A TYPICAL DAY: 3 OR 4
HOW OFTEN DO YOU HAVE A DRINK CONTAINING ALCOHOL: 4 OR MORE TIMES A WEEK

## 2025-09-01 ASSESSMENT — PAIN SCALES - GENERAL: PAINLEVEL_OUTOF10: 0

## 2025-09-02 PROBLEM — F32.A DEPRESSION WITH SUICIDAL IDEATION: Status: ACTIVE | Noted: 2025-09-02

## 2025-09-02 PROBLEM — R45.851 DEPRESSION WITH SUICIDAL IDEATION: Status: ACTIVE | Noted: 2025-09-02

## 2025-09-02 PROCEDURE — 1240000000 HC EMOTIONAL WELLNESS R&B

## 2025-09-02 ASSESSMENT — PAIN SCALES - GENERAL
PAINLEVEL_OUTOF10: 0
PAINLEVEL_OUTOF10: 0

## 2025-09-03 PROCEDURE — APPSS30 APP SPLIT SHARED TIME 16-30 MINUTES: Performed by: PHYSICIAN ASSISTANT

## 2025-09-03 PROCEDURE — 1240000000 HC EMOTIONAL WELLNESS R&B

## 2025-09-03 PROCEDURE — GZ56ZZZ INDIVIDUAL PSYCHOTHERAPY, SUPPORTIVE: ICD-10-PCS | Performed by: PSYCHIATRY & NEUROLOGY

## 2025-09-03 ASSESSMENT — PAIN SCALES - GENERAL
PAINLEVEL_OUTOF10: 0
PAINLEVEL_OUTOF10: 0

## 2025-09-04 VITALS
WEIGHT: 240 LBS | TEMPERATURE: 98.4 F | RESPIRATION RATE: 18 BRPM | HEIGHT: 67 IN | BODY MASS INDEX: 37.67 KG/M2 | OXYGEN SATURATION: 100 % | HEART RATE: 69 BPM | SYSTOLIC BLOOD PRESSURE: 113 MMHG | DIASTOLIC BLOOD PRESSURE: 76 MMHG

## 2025-09-04 PROCEDURE — 5130000000 HC BRIDGE APPOINTMENT

## 2025-09-04 ASSESSMENT — PAIN SCALES - GENERAL: PAINLEVEL_OUTOF10: 0

## (undated) DEVICE — STAPLER ENDOSCP 45MM L34CM STD NAT ARTC LIN CUT ECHELON FLX

## (undated) DEVICE — RELOAD STPL L45MM H15 35MM REG TISS BLU 6 ROW B FRM NAT